# Patient Record
Sex: MALE | Race: WHITE | NOT HISPANIC OR LATINO
[De-identification: names, ages, dates, MRNs, and addresses within clinical notes are randomized per-mention and may not be internally consistent; named-entity substitution may affect disease eponyms.]

---

## 2017-01-11 ENCOUNTER — APPOINTMENT (OUTPATIENT)
Dept: INTERNAL MEDICINE | Facility: CLINIC | Age: 57
End: 2017-01-11

## 2017-01-11 ENCOUNTER — NON-APPOINTMENT (OUTPATIENT)
Age: 57
End: 2017-01-11

## 2017-01-11 VITALS — BODY MASS INDEX: 25.92 KG/M2 | HEIGHT: 69 IN | WEIGHT: 175 LBS

## 2017-01-11 LAB
BILIRUB UR QL STRIP: NORMAL
CLARITY UR: CLEAR
COLLECTION METHOD: NORMAL
GLUCOSE UR-MCNC: NORMAL
HCG UR QL: 0.2 EU/DL
HGB UR QL STRIP.AUTO: NORMAL
KETONES UR-MCNC: NORMAL
LEUKOCYTE ESTERASE UR QL STRIP: NORMAL
NITRITE UR QL STRIP: NORMAL
PH UR STRIP: 6.5
PROT UR STRIP-MCNC: NORMAL
SP GR UR STRIP: <=1.005

## 2017-01-12 ENCOUNTER — RESULT REVIEW (OUTPATIENT)
Age: 57
End: 2017-01-12

## 2017-01-12 LAB
BASOPHILS # BLD AUTO: 0.01 K/UL
BASOPHILS NFR BLD AUTO: 0.2 %
EOSINOPHIL # BLD AUTO: 0.02 K/UL
EOSINOPHIL NFR BLD AUTO: 0.4 %
HCT VFR BLD CALC: 47.5 %
HGB BLD-MCNC: 15.6 G/DL
IMM GRANULOCYTES NFR BLD AUTO: 0.2 %
LYMPHOCYTES # BLD AUTO: 1.71 K/UL
LYMPHOCYTES NFR BLD AUTO: 32.1 %
MAN DIFF?: NORMAL
MCHC RBC-ENTMCNC: 30.9 PG
MCHC RBC-ENTMCNC: 32.8 GM/DL
MCV RBC AUTO: 94.1 FL
MONOCYTES # BLD AUTO: 0.34 K/UL
MONOCYTES NFR BLD AUTO: 6.4 %
NEUTROPHILS # BLD AUTO: 3.23 K/UL
NEUTROPHILS NFR BLD AUTO: 60.7 %
PLATELET # BLD AUTO: 203 K/UL
PSA FREE FLD-MCNC: 18.4 %
PSA FREE SERPL-MCNC: 0.32 NG/ML
PSA SERPL-MCNC: 1.75 NG/ML
RBC # BLD: 5.05 M/UL
RBC # FLD: 13.5 %
TSH SERPL-ACNC: 3.1 UIU/ML
WBC # FLD AUTO: 5.32 K/UL

## 2017-01-13 LAB
ALBUMIN SERPL ELPH-MCNC: 4.7 G/DL
ALP BLD-CCNC: 56 U/L
ALT SERPL-CCNC: 24 U/L
ANION GAP SERPL CALC-SCNC: 16 MMOL/L
AST SERPL-CCNC: 20 U/L
BILIRUB SERPL-MCNC: 0.7 MG/DL
BUN SERPL-MCNC: 12 MG/DL
CALCIUM SERPL-MCNC: 9.5 MG/DL
CHLORIDE SERPL-SCNC: 99 MMOL/L
CHOLEST SERPL-MCNC: 225 MG/DL
CHOLEST/HDLC SERPL: 2.7 RATIO
CO2 SERPL-SCNC: 22 MMOL/L
CREAT SERPL-MCNC: 1 MG/DL
GLUCOSE SERPL-MCNC: 95 MG/DL
HDLC SERPL-MCNC: 82 MG/DL
LDLC SERPL CALC-MCNC: 128 MG/DL
POTASSIUM SERPL-SCNC: 4 MMOL/L
PROT SERPL-MCNC: 7 G/DL
SODIUM SERPL-SCNC: 137 MMOL/L
TRIGL SERPL-MCNC: 75 MG/DL

## 2017-09-21 ENCOUNTER — APPOINTMENT (OUTPATIENT)
Dept: INTERNAL MEDICINE | Facility: CLINIC | Age: 57
End: 2017-09-21
Payer: COMMERCIAL

## 2017-09-21 VITALS — HEIGHT: 70 IN | WEIGHT: 171 LBS | BODY MASS INDEX: 24.48 KG/M2

## 2017-09-21 LAB
BILIRUB UR QL STRIP: NORMAL
GLUCOSE UR-MCNC: NORMAL
HCG UR QL: 0.2 EU/DL
HGB UR QL STRIP.AUTO: NORMAL
KETONES UR-MCNC: NORMAL
LEUKOCYTE ESTERASE UR QL STRIP: NORMAL
NITRITE UR QL STRIP: NORMAL
PH UR STRIP: 6
PROT UR STRIP-MCNC: NORMAL
SP GR UR STRIP: 1.01

## 2017-09-21 PROCEDURE — 81003 URINALYSIS AUTO W/O SCOPE: CPT | Mod: QW

## 2017-09-21 PROCEDURE — 93000 ELECTROCARDIOGRAM COMPLETE: CPT

## 2017-09-21 PROCEDURE — 99396 PREV VISIT EST AGE 40-64: CPT | Mod: 25

## 2017-09-21 PROCEDURE — 36415 COLL VENOUS BLD VENIPUNCTURE: CPT

## 2017-09-22 ENCOUNTER — RX RENEWAL (OUTPATIENT)
Age: 57
End: 2017-09-22

## 2017-09-22 LAB
ALBUMIN SERPL ELPH-MCNC: 4.5 G/DL
ALP BLD-CCNC: 66 U/L
ALT SERPL-CCNC: 37 U/L
ANION GAP SERPL CALC-SCNC: 19 MMOL/L
AST SERPL-CCNC: 31 U/L
BASOPHILS # BLD AUTO: 0.01 K/UL
BASOPHILS NFR BLD AUTO: 0.2 %
BILIRUB SERPL-MCNC: 0.8 MG/DL
BUN SERPL-MCNC: 19 MG/DL
CALCIUM SERPL-MCNC: 9.7 MG/DL
CHLORIDE SERPL-SCNC: 103 MMOL/L
CHOLEST SERPL-MCNC: 220 MG/DL
CHOLEST/HDLC SERPL: 2.4 RATIO
CO2 SERPL-SCNC: 22 MMOL/L
CREAT SERPL-MCNC: 1.12 MG/DL
EOSINOPHIL # BLD AUTO: 0.02 K/UL
EOSINOPHIL NFR BLD AUTO: 0.4 %
GLUCOSE SERPL-MCNC: 93 MG/DL
HCT VFR BLD CALC: 48.2 %
HDLC SERPL-MCNC: 93 MG/DL
HGB BLD-MCNC: 15.8 G/DL
IMM GRANULOCYTES NFR BLD AUTO: 0.2 %
LDLC SERPL CALC-MCNC: 116 MG/DL
LYMPHOCYTES # BLD AUTO: 1.7 K/UL
LYMPHOCYTES NFR BLD AUTO: 30.8 %
MAN DIFF?: NORMAL
MCHC RBC-ENTMCNC: 30.3 PG
MCHC RBC-ENTMCNC: 32.8 GM/DL
MCV RBC AUTO: 92.5 FL
MONOCYTES # BLD AUTO: 0.38 K/UL
MONOCYTES NFR BLD AUTO: 6.9 %
NEUTROPHILS # BLD AUTO: 3.4 K/UL
NEUTROPHILS NFR BLD AUTO: 61.5 %
PLATELET # BLD AUTO: 214 K/UL
POTASSIUM SERPL-SCNC: 4.4 MMOL/L
PROT SERPL-MCNC: 7.3 G/DL
PSA FREE FLD-MCNC: 16.7
PSA FREE SERPL-MCNC: 0.35 NG/ML
PSA SERPL-MCNC: 2.09 NG/ML
RBC # BLD: 5.21 M/UL
RBC # FLD: 13.6 %
SODIUM SERPL-SCNC: 144 MMOL/L
T4 FREE SERPL-MCNC: 1.3 NG/DL
TRIGL SERPL-MCNC: 56 MG/DL
TSH SERPL-ACNC: 2.63 UIU/ML
WBC # FLD AUTO: 5.52 K/UL

## 2017-10-19 ENCOUNTER — APPOINTMENT (OUTPATIENT)
Dept: INTERNAL MEDICINE | Facility: CLINIC | Age: 57
End: 2017-10-19
Payer: COMMERCIAL

## 2017-10-19 VITALS
SYSTOLIC BLOOD PRESSURE: 140 MMHG | DIASTOLIC BLOOD PRESSURE: 80 MMHG | HEIGHT: 70 IN | WEIGHT: 171 LBS | HEART RATE: 89 BPM | OXYGEN SATURATION: 97 % | BODY MASS INDEX: 24.48 KG/M2

## 2017-10-19 DIAGNOSIS — I10 ESSENTIAL (PRIMARY) HYPERTENSION: ICD-10-CM

## 2017-10-19 PROCEDURE — 99213 OFFICE O/P EST LOW 20 MIN: CPT

## 2017-11-27 ENCOUNTER — APPOINTMENT (OUTPATIENT)
Dept: INTERNAL MEDICINE | Facility: CLINIC | Age: 57
End: 2017-11-27
Payer: COMMERCIAL

## 2017-11-27 VITALS
WEIGHT: 171 LBS | HEART RATE: 79 BPM | SYSTOLIC BLOOD PRESSURE: 120 MMHG | OXYGEN SATURATION: 98 % | HEIGHT: 70 IN | DIASTOLIC BLOOD PRESSURE: 76 MMHG | BODY MASS INDEX: 24.48 KG/M2

## 2017-11-27 PROCEDURE — 99213 OFFICE O/P EST LOW 20 MIN: CPT

## 2017-12-22 ENCOUNTER — MEDICATION RENEWAL (OUTPATIENT)
Age: 57
End: 2017-12-22

## 2018-11-28 ENCOUNTER — MEDICATION RENEWAL (OUTPATIENT)
Age: 58
End: 2018-11-28

## 2018-11-28 ENCOUNTER — APPOINTMENT (OUTPATIENT)
Dept: INTERNAL MEDICINE | Facility: CLINIC | Age: 58
End: 2018-11-28
Payer: COMMERCIAL

## 2018-11-28 ENCOUNTER — NON-APPOINTMENT (OUTPATIENT)
Age: 58
End: 2018-11-28

## 2018-11-28 VITALS
OXYGEN SATURATION: 99 % | WEIGHT: 179 LBS | HEART RATE: 78 BPM | HEIGHT: 70 IN | SYSTOLIC BLOOD PRESSURE: 144 MMHG | BODY MASS INDEX: 25.62 KG/M2 | DIASTOLIC BLOOD PRESSURE: 68 MMHG

## 2018-11-28 VITALS — DIASTOLIC BLOOD PRESSURE: 74 MMHG | SYSTOLIC BLOOD PRESSURE: 138 MMHG

## 2018-11-28 PROCEDURE — 99396 PREV VISIT EST AGE 40-64: CPT | Mod: 25

## 2018-11-28 PROCEDURE — 93000 ELECTROCARDIOGRAM COMPLETE: CPT

## 2018-11-28 PROCEDURE — 36415 COLL VENOUS BLD VENIPUNCTURE: CPT

## 2018-11-28 NOTE — HEALTH RISK ASSESSMENT
[] : No [0] : 2) Feeling down, depressed, or hopeless: Not at all (0) [de-identified] : occasional [[id number]] : 0 [HIV test declined] : HIV test declined [ColonoscopyDate] : 12/11

## 2018-11-28 NOTE — HISTORY OF PRESENT ILLNESS
[de-identified] : Patient presents for CPE. History is significant for hypertension on amlodipine and valsartan. He feels well and has no acute complaints.

## 2018-11-28 NOTE — ASSESSMENT
[FreeTextEntry1] : Will monitor BP at home. \par Further recommendations based on labs.\par Had flu vaccine. \par Colonoscopy 12/11. \par Follow up annually and prn.

## 2018-11-29 ENCOUNTER — TRANSCRIPTION ENCOUNTER (OUTPATIENT)
Age: 58
End: 2018-11-29

## 2018-11-29 LAB
ALBUMIN SERPL ELPH-MCNC: 4.9 G/DL
ALP BLD-CCNC: 54 U/L
ALT SERPL-CCNC: 46 U/L
ANION GAP SERPL CALC-SCNC: 12 MMOL/L
APPEARANCE: CLEAR
AST SERPL-CCNC: 28 U/L
BACTERIA: NEGATIVE
BASOPHILS # BLD AUTO: 0.01 K/UL
BASOPHILS NFR BLD AUTO: 0.2 %
BILIRUB SERPL-MCNC: 0.6 MG/DL
BILIRUBIN URINE: NEGATIVE
BLOOD URINE: NEGATIVE
BUN SERPL-MCNC: 14 MG/DL
CALCIUM SERPL-MCNC: 9.6 MG/DL
CHLORIDE SERPL-SCNC: 104 MMOL/L
CHOLEST SERPL-MCNC: 222 MG/DL
CHOLEST/HDLC SERPL: 2.7 RATIO
CO2 SERPL-SCNC: 26 MMOL/L
COLOR: YELLOW
CREAT SERPL-MCNC: 0.91 MG/DL
EOSINOPHIL # BLD AUTO: 0.03 K/UL
EOSINOPHIL NFR BLD AUTO: 0.5 %
GLUCOSE QUALITATIVE U: NEGATIVE MG/DL
GLUCOSE SERPL-MCNC: 81 MG/DL
HBA1C MFR BLD HPLC: 5.4 %
HCT VFR BLD CALC: 48.5 %
HCV AB SER QL: NONREACTIVE
HCV S/CO RATIO: 0.08 S/CO
HDLC SERPL-MCNC: 82 MG/DL
HGB BLD-MCNC: 15.5 G/DL
IMM GRANULOCYTES NFR BLD AUTO: 0 %
KETONES URINE: NEGATIVE
LDLC SERPL CALC-MCNC: 131 MG/DL
LEUKOCYTE ESTERASE URINE: NEGATIVE
LYMPHOCYTES # BLD AUTO: 1.94 K/UL
LYMPHOCYTES NFR BLD AUTO: 31.1 %
MAN DIFF?: NORMAL
MCHC RBC-ENTMCNC: 30.2 PG
MCHC RBC-ENTMCNC: 32 GM/DL
MCV RBC AUTO: 94.4 FL
MICROSCOPIC-UA: NORMAL
MONOCYTES # BLD AUTO: 0.31 K/UL
MONOCYTES NFR BLD AUTO: 5 %
NEUTROPHILS # BLD AUTO: 3.94 K/UL
NEUTROPHILS NFR BLD AUTO: 63.2 %
NITRITE URINE: NEGATIVE
PH URINE: 7
PLATELET # BLD AUTO: 228 K/UL
POTASSIUM SERPL-SCNC: 4.4 MMOL/L
PROT SERPL-MCNC: 7.5 G/DL
PROTEIN URINE: NEGATIVE MG/DL
PSA SERPL-MCNC: 2.12 NG/ML
RBC # BLD: 5.14 M/UL
RBC # FLD: 13.4 %
RED BLOOD CELLS URINE: 0 /HPF
SODIUM SERPL-SCNC: 143 MMOL/L
SPECIFIC GRAVITY URINE: 1.01
SQUAMOUS EPITHELIAL CELLS: 0 /HPF
TRIGL SERPL-MCNC: 46 MG/DL
TSH SERPL-ACNC: 2.71 UIU/ML
UROBILINOGEN URINE: NEGATIVE MG/DL
WBC # FLD AUTO: 6.23 K/UL
WHITE BLOOD CELLS URINE: 0 /HPF

## 2019-08-29 ENCOUNTER — RX RENEWAL (OUTPATIENT)
Age: 59
End: 2019-08-29

## 2019-11-07 ENCOUNTER — TRANSCRIPTION ENCOUNTER (OUTPATIENT)
Age: 59
End: 2019-11-07

## 2019-11-07 ENCOUNTER — APPOINTMENT (OUTPATIENT)
Dept: INTERNAL MEDICINE | Facility: CLINIC | Age: 59
End: 2019-11-07
Payer: COMMERCIAL

## 2019-11-07 ENCOUNTER — NON-APPOINTMENT (OUTPATIENT)
Age: 59
End: 2019-11-07

## 2019-11-07 VITALS
HEIGHT: 70 IN | BODY MASS INDEX: 25.77 KG/M2 | SYSTOLIC BLOOD PRESSURE: 145 MMHG | OXYGEN SATURATION: 99 % | HEART RATE: 66 BPM | DIASTOLIC BLOOD PRESSURE: 81 MMHG | WEIGHT: 180 LBS

## 2019-11-07 DIAGNOSIS — N40.0 BENIGN PROSTATIC HYPERPLASIA WITHOUT LOWER URINARY TRACT SYMPMS: ICD-10-CM

## 2019-11-07 PROCEDURE — 90471 IMMUNIZATION ADMIN: CPT

## 2019-11-07 PROCEDURE — 99396 PREV VISIT EST AGE 40-64: CPT | Mod: 25

## 2019-11-07 PROCEDURE — 96127 BRIEF EMOTIONAL/BEHAV ASSMT: CPT

## 2019-11-07 PROCEDURE — 36415 COLL VENOUS BLD VENIPUNCTURE: CPT

## 2019-11-07 PROCEDURE — 93000 ELECTROCARDIOGRAM COMPLETE: CPT

## 2019-11-07 PROCEDURE — 90715 TDAP VACCINE 7 YRS/> IM: CPT

## 2019-11-07 NOTE — ADDENDUM
[FreeTextEntry1] : I, Sanjuanita Lopez, acted solely as a scribe for Dr. Christianson on this date [11/7/2019].

## 2019-11-07 NOTE — HISTORY OF PRESENT ILLNESS
[de-identified] : Patient presents for CPE. History is significant for hypertension on amlodipine and valsartan. He checks his blood pressure at home and report that it is usually 130-135/90. He feels well and has no acute complaints.\par He has 3 children, ages 24, 22, and 16.

## 2019-11-07 NOTE — ASSESSMENT
[FreeTextEntry1] : Blood pressure high. Increase valsartan to 160mg qd. \par Labs drawn in office. Further recommendations based on results.\par Tdap administered in left deltoid in office with no localized reaction.\par Had flu vaccine. \par Colonoscopy 12/11.\par Follow up BP with new physician in 2-3 months.

## 2019-11-07 NOTE — END OF VISIT
[FreeTextEntry3] : All medical record entries made by the Scribe were at my, Dr. Christianson's, direction and personally dictated by me on [11/7/2019]. I have reviewed the chart and agree that the record accurately reflects my personal performance of the history, physical exam, assessment and plan. I have also personally directed, reviewed and agreed with the chart.

## 2019-11-08 ENCOUNTER — TRANSCRIPTION ENCOUNTER (OUTPATIENT)
Age: 59
End: 2019-11-08

## 2019-11-08 LAB
ALBUMIN SERPL ELPH-MCNC: 4.8 G/DL
ALP BLD-CCNC: 61 U/L
ALT SERPL-CCNC: 30 U/L
ANION GAP SERPL CALC-SCNC: 16 MMOL/L
APPEARANCE: CLEAR
AST SERPL-CCNC: 23 U/L
BACTERIA: NEGATIVE
BASOPHILS # BLD AUTO: 0.01 K/UL
BASOPHILS NFR BLD AUTO: 0.2 %
BILIRUB SERPL-MCNC: 0.6 MG/DL
BILIRUBIN URINE: NEGATIVE
BLOOD URINE: NEGATIVE
BUN SERPL-MCNC: 15 MG/DL
CALCIUM SERPL-MCNC: 9.6 MG/DL
CHLORIDE SERPL-SCNC: 101 MMOL/L
CHOLEST SERPL-MCNC: 207 MG/DL
CHOLEST/HDLC SERPL: 2.5 RATIO
CO2 SERPL-SCNC: 23 MMOL/L
COLOR: COLORLESS
CREAT SERPL-MCNC: 0.93 MG/DL
EOSINOPHIL # BLD AUTO: 0.04 K/UL
EOSINOPHIL NFR BLD AUTO: 0.7 %
GLUCOSE QUALITATIVE U: NEGATIVE
GLUCOSE SERPL-MCNC: 97 MG/DL
HCT VFR BLD CALC: 49.1 %
HDLC SERPL-MCNC: 83 MG/DL
HGB BLD-MCNC: 15.2 G/DL
HYALINE CASTS: 0 /LPF
IMM GRANULOCYTES NFR BLD AUTO: 0.4 %
KETONES URINE: NEGATIVE
LDLC SERPL CALC-MCNC: 108 MG/DL
LEUKOCYTE ESTERASE URINE: NEGATIVE
LYMPHOCYTES # BLD AUTO: 1.78 K/UL
LYMPHOCYTES NFR BLD AUTO: 33.2 %
MAN DIFF?: NORMAL
MCHC RBC-ENTMCNC: 29.9 PG
MCHC RBC-ENTMCNC: 31 GM/DL
MCV RBC AUTO: 96.7 FL
MICROSCOPIC-UA: NORMAL
MONOCYTES # BLD AUTO: 0.44 K/UL
MONOCYTES NFR BLD AUTO: 8.2 %
NEUTROPHILS # BLD AUTO: 3.07 K/UL
NEUTROPHILS NFR BLD AUTO: 57.3 %
NITRITE URINE: NEGATIVE
PH URINE: 6.5
PLATELET # BLD AUTO: 216 K/UL
POTASSIUM SERPL-SCNC: 4.4 MMOL/L
PROT SERPL-MCNC: 6.7 G/DL
PROTEIN URINE: NEGATIVE
PSA SERPL-MCNC: 2.3 NG/ML
RBC # BLD: 5.08 M/UL
RBC # FLD: 13.2 %
RED BLOOD CELLS URINE: 0 /HPF
SODIUM SERPL-SCNC: 140 MMOL/L
SPECIFIC GRAVITY URINE: 1.01
SQUAMOUS EPITHELIAL CELLS: 0 /HPF
TRIGL SERPL-MCNC: 79 MG/DL
TSH SERPL-ACNC: 3.32 UIU/ML
UROBILINOGEN URINE: NORMAL
WBC # FLD AUTO: 5.36 K/UL
WHITE BLOOD CELLS URINE: 0 /HPF

## 2019-11-11 ENCOUNTER — TRANSCRIPTION ENCOUNTER (OUTPATIENT)
Age: 59
End: 2019-11-11

## 2020-02-18 ENCOUNTER — RX RENEWAL (OUTPATIENT)
Age: 60
End: 2020-02-18

## 2020-06-17 ENCOUNTER — APPOINTMENT (OUTPATIENT)
Dept: FAMILY MEDICINE | Facility: CLINIC | Age: 60
End: 2020-06-17
Payer: COMMERCIAL

## 2020-06-17 VITALS
HEIGHT: 70 IN | HEART RATE: 70 BPM | BODY MASS INDEX: 24.77 KG/M2 | WEIGHT: 173 LBS | SYSTOLIC BLOOD PRESSURE: 138 MMHG | DIASTOLIC BLOOD PRESSURE: 70 MMHG

## 2020-06-17 DIAGNOSIS — Z82.49 FAMILY HISTORY OF ISCHEMIC HEART DISEASE AND OTHER DISEASES OF THE CIRCULATORY SYSTEM: ICD-10-CM

## 2020-06-17 DIAGNOSIS — Z78.9 OTHER SPECIFIED HEALTH STATUS: ICD-10-CM

## 2020-06-17 PROCEDURE — 36415 COLL VENOUS BLD VENIPUNCTURE: CPT

## 2020-06-17 PROCEDURE — 99203 OFFICE O/P NEW LOW 30 MIN: CPT | Mod: 25

## 2020-06-17 NOTE — PLAN
[FreeTextEntry1] : RECORDS REVIEWED AND CHART UPDATED\par BLOOD PRESSURE WELL CONTROLLED - CONTINUE CURRENT MEDICATIONS\par CHECK LAB WORK AND WILL MONITOR\par DUE FOR NEXT COLONOSCOPY\par CARDIOLOGY FOR FAMILY HISTORY / HTN\par FLU VACCINE IN THE FALL RECOMMENDED; CONSIDER SHINGRIX\par CALL WITH ANY QUESTIONS, CONCERNS OR CHANGES \par

## 2020-06-17 NOTE — PHYSICAL EXAM
[Well Nourished] : well nourished [No Acute Distress] : no acute distress [No Lymphadenopathy] : no lymphadenopathy [Well-Appearing] : well-appearing [Supple] : supple [No Respiratory Distress] : no respiratory distress  [Clear to Auscultation] : lungs were clear to auscultation bilaterally [No Accessory Muscle Use] : no accessory muscle use [Normal Rate] : normal rate  [Regular Rhythm] : with a regular rhythm [Speech Grossly Normal] : speech grossly normal [Normal S1, S2] : normal S1 and S2 [Normal Mood] : the mood was normal [Normal Affect] : the affect was normal

## 2020-06-17 NOTE — HISTORY OF PRESENT ILLNESS
[de-identified] : MR. ZARATE IS A PLEASANT 61 YO PRESENTING TO ESTABLISH CARE.  CHRONIC HISTORY OF HYPERTENSION.  TAKING AMLODIPINE AND VALSARTAN.  NO SIDE EFFECTS FROM MEDICATIONS.  BLOOD PRESSURE CONTROLLED.  HAS BEEN WALKING EVERY MORNING FOR THREE MILES.  DIET IS "OKAY".  AVOIDING JUNK FOODS. FEELING WELL TODAY WITHOUT COMPLAINT.  NO PRIOR CARDIOLOGY EVALUATION.  DUE FOR COLONOSCOPY.  HAS HAD NO HEADACHE, DIZZINESS, CHEST PAIN, SHORTNESS OF BREATH, GI OR URINARY COMPLAINTS.  NO OTHER COMPLAINTS TODAY. \par \par SPECIALISTS: \par GI: DR. CAMPBELL\par OPHTHALMOLOGY: [facility], YEARLY [FreeTextEntry1] : ESTABLISH CARE, FOLLOW-UP HYPERTENSION

## 2020-06-18 LAB
ANION GAP SERPL CALC-SCNC: 13 MMOL/L
BUN SERPL-MCNC: 14 MG/DL
CALCIUM SERPL-MCNC: 9.7 MG/DL
CHLORIDE SERPL-SCNC: 102 MMOL/L
CO2 SERPL-SCNC: 24 MMOL/L
CREAT SERPL-MCNC: 0.96 MG/DL
GLUCOSE SERPL-MCNC: 93 MG/DL
POTASSIUM SERPL-SCNC: 4.6 MMOL/L
SODIUM SERPL-SCNC: 140 MMOL/L

## 2020-07-01 LAB — HEMOCCULT STL QL IA: NEGATIVE

## 2020-11-06 ENCOUNTER — APPOINTMENT (OUTPATIENT)
Dept: FAMILY MEDICINE | Facility: CLINIC | Age: 60
End: 2020-11-06
Payer: COMMERCIAL

## 2020-11-06 VITALS
HEART RATE: 72 BPM | DIASTOLIC BLOOD PRESSURE: 80 MMHG | BODY MASS INDEX: 25.05 KG/M2 | HEIGHT: 70 IN | RESPIRATION RATE: 18 BRPM | SYSTOLIC BLOOD PRESSURE: 130 MMHG | WEIGHT: 175 LBS | TEMPERATURE: 97.3 F

## 2020-11-06 PROCEDURE — 99214 OFFICE O/P EST MOD 30 MIN: CPT

## 2020-11-06 PROCEDURE — 99072 ADDL SUPL MATRL&STAF TM PHE: CPT

## 2020-11-06 NOTE — HISTORY OF PRESENT ILLNESS
[No Pertinent Cardiac History] : no history of aortic stenosis, atrial fibrillation, coronary artery disease, recent myocardial infarction, or implantable device/pacemaker [No Pertinent Pulmonary History] : no history of asthma, COPD, sleep apnea, or smoking [No Adverse Anesthesia Reaction] : no adverse anesthesia reaction in self or family member [(Patient denies any chest pain, claudication, dyspnea on exertion, orthopnea, palpitations or syncope)] : Patient denies any chest pain, claudication, dyspnea on exertion, orthopnea, palpitations or syncope [____ METs%] : [unfilled] METs% [FreeTextEntry1] : testicle cyst removed [FreeTextEntry2] : 11/10/2020 [FreeTextEntry3] : Kush Bowling  [FreeTextEntry4] : Mr. GOYO ZARATE is a 60 year old male with PMH of HTN and HLD who normally follows up with Dr Anthony. Patient comes in to the office for preoperative clearance for a scrotal cyst removal by urology Dr Kush Bowling at [facility] on 11/06/2020. Patient denies any respiratory, GI, pain in his genital area or other complaints at this time.

## 2020-11-06 NOTE — PLAN
[FreeTextEntry1] : \par Mr. ZARATE is a 60 year male  who comes to the office for preoperative evaluation. Patient has greater than 4 METS, is a low perioperative risk for Major adverse cardiac event. ECG and blood work from 11/03/2020 at [facility] reviewed. No further testing indicated at this time. Patient is medically optimized for this procedure. \par \par During conversation with patient extensive medical records were reviewed including but not limited to, Hospital records records, out patient records, laboratory data and microbiology data \par In addition extensive time was also spent in reviewing diagnostic studies.\par \par Total encounter total time 25 mins \par >50% of time spent counseling/coordinating care \par \par Counseling included abnormal lab results, differential diagnoses, treatment options, risks and benefits, lifestyle changes, current condition, medications, and dose adjustments. \par The patient was interactive, attentive, asked questions, and verbalized understanding.\par

## 2020-11-06 NOTE — PHYSICAL EXAM
[No Acute Distress] : no acute distress [Well Nourished] : well nourished [Well Developed] : well developed [Well-Appearing] : well-appearing [Normal Sclera/Conjunctiva] : normal sclera/conjunctiva [PERRL] : pupils equal round and reactive to light [EOMI] : extraocular movements intact [Normal Outer Ear/Nose] : the outer ears and nose were normal in appearance [Normal Oropharynx] : the oropharynx was normal [No JVD] : no jugular venous distention [No Lymphadenopathy] : no lymphadenopathy [Supple] : supple [No Respiratory Distress] : no respiratory distress  [No Accessory Muscle Use] : no accessory muscle use [Clear to Auscultation] : lungs were clear to auscultation bilaterally [Normal Rate] : normal rate  [Regular Rhythm] : with a regular rhythm [Normal S1, S2] : normal S1 and S2 [No Murmur] : no murmur heard [No Carotid Bruits] : no carotid bruits [No Abdominal Bruit] : a ~M bruit was not heard ~T in the abdomen [No Varicosities] : no varicosities [Pedal Pulses Present] : the pedal pulses are present [No Edema] : there was no peripheral edema [No Palpable Aorta] : no palpable aorta [No Extremity Clubbing/Cyanosis] : no extremity clubbing/cyanosis [Soft] : abdomen soft [Non Tender] : non-tender [Non-distended] : non-distended [No Masses] : no abdominal mass palpated [No HSM] : no HSM [Normal Bowel Sounds] : normal bowel sounds [Urethral Meatus] : meatus normal [Penis Abnormality] : normal circumcised penis [Testes Tenderness] : no tenderness of the testes [Normal Posterior Cervical Nodes] : no posterior cervical lymphadenopathy [Normal Anterior Cervical Nodes] : no anterior cervical lymphadenopathy [No CVA Tenderness] : no CVA  tenderness [No Spinal Tenderness] : no spinal tenderness [No Joint Swelling] : no joint swelling [Grossly Normal Strength/Tone] : grossly normal strength/tone [No Rash] : no rash [Coordination Grossly Intact] : coordination grossly intact [No Focal Deficits] : no focal deficits [Normal Gait] : normal gait [Normal Affect] : the affect was normal [Normal Insight/Judgement] : insight and judgment were intact [Normal Nasal Mucosa] : the nasal mucosa was normal [Speech Grossly Normal] : speech grossly normal [Normal Mood] : the mood was normal [FreeTextEntry1] : 1x1cm soft mobile mass on right scrotum. No bleeding or other wounds noted

## 2020-11-09 ENCOUNTER — APPOINTMENT (OUTPATIENT)
Dept: FAMILY MEDICINE | Facility: CLINIC | Age: 60
End: 2020-11-09

## 2020-11-18 ENCOUNTER — APPOINTMENT (OUTPATIENT)
Dept: FAMILY MEDICINE | Facility: CLINIC | Age: 60
End: 2020-11-18
Payer: COMMERCIAL

## 2020-11-18 VITALS
SYSTOLIC BLOOD PRESSURE: 150 MMHG | TEMPERATURE: 97.9 F | BODY MASS INDEX: 25.05 KG/M2 | DIASTOLIC BLOOD PRESSURE: 72 MMHG | HEIGHT: 70 IN | WEIGHT: 175 LBS

## 2020-11-18 PROCEDURE — 99396 PREV VISIT EST AGE 40-64: CPT | Mod: 25

## 2020-11-18 PROCEDURE — 36415 COLL VENOUS BLD VENIPUNCTURE: CPT

## 2020-11-18 RX ORDER — OXYCODONE AND ACETAMINOPHEN 5; 325 MG/1; MG/1
5-325 TABLET ORAL
Qty: 10 | Refills: 0 | Status: DISCONTINUED | COMMUNITY
Start: 2020-11-10

## 2020-11-18 RX ORDER — CEFADROXIL 500 MG/1
500 CAPSULE ORAL
Qty: 14 | Refills: 0 | Status: COMPLETED | COMMUNITY
Start: 2020-07-09

## 2020-11-18 RX ORDER — SODIUM SULFATE, POTASSIUM SULFATE, MAGNESIUM SULFATE 17.5; 3.13; 1.6 G/ML; G/ML; G/ML
17.5-3.13-1.6 SOLUTION, CONCENTRATE ORAL
Qty: 354 | Refills: 0 | Status: COMPLETED | COMMUNITY
Start: 2020-07-07

## 2020-11-18 NOTE — PHYSICAL EXAM
[No Acute Distress] : no acute distress [Well Nourished] : well nourished [Well Developed] : well developed [Well-Appearing] : well-appearing [Normal Sclera/Conjunctiva] : normal sclera/conjunctiva [PERRL] : pupils equal round and reactive to light [EOMI] : extraocular movements intact [Normal Outer Ear/Nose] : the outer ears and nose were normal in appearance [Normal Oropharynx] : the oropharynx was normal [Normal TMs] : both tympanic membranes were normal [Normal Nasal Mucosa] : the nasal mucosa was normal [No JVD] : no jugular venous distention [No Lymphadenopathy] : no lymphadenopathy [Supple] : supple [No Respiratory Distress] : no respiratory distress  [No Accessory Muscle Use] : no accessory muscle use [Clear to Auscultation] : lungs were clear to auscultation bilaterally [Normal Rate] : normal rate  [Regular Rhythm] : with a regular rhythm [Normal S1, S2] : normal S1 and S2 [No Murmur] : no murmur heard [No Carotid Bruits] : no carotid bruits [No Abdominal Bruit] : a ~M bruit was not heard ~T in the abdomen [No Varicosities] : no varicosities [Pedal Pulses Present] : the pedal pulses are present [No Edema] : there was no peripheral edema [No Palpable Aorta] : no palpable aorta [No Extremity Clubbing/Cyanosis] : no extremity clubbing/cyanosis [Soft] : abdomen soft [Non Tender] : non-tender [Non-distended] : non-distended [No Masses] : no abdominal mass palpated [No HSM] : no HSM [Normal Axillary Nodes] : no axillary lymphadenopathy [Normal Posterior Cervical Nodes] : no posterior cervical lymphadenopathy [Normal Anterior Cervical Nodes] : no anterior cervical lymphadenopathy [No CVA Tenderness] : no CVA  tenderness [No Spinal Tenderness] : no spinal tenderness [No Joint Swelling] : no joint swelling [Grossly Normal Strength/Tone] : grossly normal strength/tone [No Rash] : no rash [Coordination Grossly Intact] : coordination grossly intact [No Focal Deficits] : no focal deficits [Normal Gait] : normal gait [Speech Grossly Normal] : speech grossly normal [Normal Affect] : the affect was normal [Normal Mood] : the mood was normal [Normal Insight/Judgement] : insight and judgment were intact [de-identified] : declined

## 2020-11-18 NOTE — DATA REVIEWED
[FreeTextEntry1] : I reviewed information including but not limited to previous notes and laboratories for this patient

## 2020-11-18 NOTE — HISTORY OF PRESENT ILLNESS
[FreeTextEntry1] : CPE [de-identified] : Mr. GOYO ZARATE is a 60 year old male with PMH of HTN who normally follows up with Dr Anthony. Patient comes to the office for CPE. He is s/p right scrotum cyst removal on 11/10/2020 by urology, w/o complications. Patient denies complaints today

## 2020-11-18 NOTE — ASSESSMENT
[FreeTextEntry1] : \par \par In regards annual physical exam: Influenza declined as he reports receiving it on oct/2020 given, Shingles vaccine declines: will find out with his his insurance\par Lab test ordered as noted. Recent lab wotrk and EKG reviewed in chart\par Colon cancer: FOBT done on  06/17/2020: WNL\par \par \par Regarding advanced care planning: I discussed with this patient the different options and importance of advance care planning, including but not limited to health care proxy designation, MOLST forms and/or writing patient's medical decision in this medical note. Patient was aware of the options. Please refer to advance care planning session of this note for detail information \par \par In regards to hypertension\par Patient's BP in adequate range. DIscussed avoid using salt and taking medications as indicated\par  \par In regards to hyperlipidemia and overweight\par I encouraged patient to do aerobic exercise, avoid fatty foods, to take the medications as prescribe and\par keep up follow up appointments with MD to monitor the cholesterol/triglycerides levels \par \par Return to care: within 6-12 months or as needed\par Call or return for any questions

## 2020-11-18 NOTE — HEALTH RISK ASSESSMENT
[Yes] : Yes [2 - 3 times a week (3 pts)] : 2 - 3  times a week (3 points) [1 or 2 (0 pts)] : 1 or 2 (0 points) [Never (0 pts)] : Never (0 points) [No] : In the past 12 months have you used drugs other than those required for medical reasons? No [0] : 2) Feeling down, depressed, or hopeless: Not at all (0) [Patient reported colonoscopy was normal] : Patient reported colonoscopy was normal [HIV Test offered] : HIV Test offered [Hepatitis C test offered] : Hepatitis C test offered [Employed] : employed [] :  [Sexually Active] : sexually active [Fully functional (bathing, dressing, toileting, transferring, walking, feeding)] : Fully functional (bathing, dressing, toileting, transferring, walking, feeding) [Fully functional (using the telephone, shopping, preparing meals, housekeeping, doing laundry, using] : Fully functional and needs no help or supervision to perform IADLs (using the telephone, shopping, preparing meals, housekeeping, doing laundry, using transportation, managing medications and managing finances) [Patient/Caregiver not ready to engage] : Patient/Caregiver not ready to engage [] : No [Audit-CScore] : 0 [[id number]] : 0 [High Risk Behavior] : no high risk behavior [ColonoscopyDate] : 08/2020 [ColonoscopyComments] : By Dr Rivero [AdvancecareDate] : 11/18/2020

## 2020-11-23 LAB
CHOLEST SERPL-MCNC: 235 MG/DL
ESTIMATED AVERAGE GLUCOSE: 108 MG/DL
HBA1C MFR BLD HPLC: 5.4 %
HCV AB SER QL: NONREACTIVE
HCV S/CO RATIO: 0.09 S/CO
HDLC SERPL-MCNC: 90 MG/DL
HIV1+2 AB SPEC QL IA.RAPID: NONREACTIVE
LDLC SERPL CALC-MCNC: 133 MG/DL
NONHDLC SERPL-MCNC: 145 MG/DL
TRIGL SERPL-MCNC: 60 MG/DL
TSH SERPL-ACNC: 2.24 UIU/ML

## 2021-01-13 ENCOUNTER — APPOINTMENT (OUTPATIENT)
Dept: FAMILY MEDICINE | Facility: CLINIC | Age: 61
End: 2021-01-13
Payer: COMMERCIAL

## 2021-01-13 ENCOUNTER — NON-APPOINTMENT (OUTPATIENT)
Age: 61
End: 2021-01-13

## 2021-01-13 VITALS
TEMPERATURE: 96.7 F | HEIGHT: 70 IN | HEART RATE: 84 BPM | WEIGHT: 175 LBS | RESPIRATION RATE: 16 BRPM | DIASTOLIC BLOOD PRESSURE: 76 MMHG | SYSTOLIC BLOOD PRESSURE: 137 MMHG | BODY MASS INDEX: 25.05 KG/M2

## 2021-01-13 PROCEDURE — 99214 OFFICE O/P EST MOD 30 MIN: CPT | Mod: 25

## 2021-01-13 PROCEDURE — 93000 ELECTROCARDIOGRAM COMPLETE: CPT

## 2021-01-13 PROCEDURE — 36415 COLL VENOUS BLD VENIPUNCTURE: CPT

## 2021-01-13 PROCEDURE — 99072 ADDL SUPL MATRL&STAF TM PHE: CPT

## 2021-01-13 NOTE — PHYSICAL EXAM
[No Acute Distress] : no acute distress [Well Nourished] : well nourished [Well Developed] : well developed [Well-Appearing] : well-appearing [Normal Sclera/Conjunctiva] : normal sclera/conjunctiva [PERRL] : pupils equal round and reactive to light [EOMI] : extraocular movements intact [Normal Outer Ear/Nose] : the outer ears and nose were normal in appearance [Normal Oropharynx] : the oropharynx was normal [Normal TMs] : both tympanic membranes were normal [Normal Nasal Mucosa] : the nasal mucosa was normal [No JVD] : no jugular venous distention [No Lymphadenopathy] : no lymphadenopathy [Supple] : supple [Thyroid Normal, No Nodules] : the thyroid was normal and there were no nodules present [No Respiratory Distress] : no respiratory distress  [No Accessory Muscle Use] : no accessory muscle use [Clear to Auscultation] : lungs were clear to auscultation bilaterally [Normal Rate] : normal rate  [Regular Rhythm] : with a regular rhythm [Normal S1, S2] : normal S1 and S2 [No Murmur] : no murmur heard [No Carotid Bruits] : no carotid bruits [Pedal Pulses Present] : the pedal pulses are present [No Edema] : there was no peripheral edema [No Palpable Aorta] : no palpable aorta [No Extremity Clubbing/Cyanosis] : no extremity clubbing/cyanosis [Soft] : abdomen soft [Non Tender] : non-tender [Non-distended] : non-distended [No Masses] : no abdominal mass palpated [No HSM] : no HSM [Normal Bowel Sounds] : normal bowel sounds [Normal Posterior Cervical Nodes] : no posterior cervical lymphadenopathy [Normal Anterior Cervical Nodes] : no anterior cervical lymphadenopathy [No CVA Tenderness] : no CVA  tenderness [No Joint Swelling] : no joint swelling [Grossly Normal Strength/Tone] : grossly normal strength/tone [No Rash] : no rash [Coordination Grossly Intact] : coordination grossly intact [No Focal Deficits] : no focal deficits [Normal Gait] : normal gait [Deep Tendon Reflexes (DTR)] : deep tendon reflexes were 2+ and symmetric [Speech Grossly Normal] : speech grossly normal [Normal Affect] : the affect was normal [Normal Mood] : the mood was normal [Normal Insight/Judgement] : insight and judgment were intact

## 2021-01-14 LAB
ALBUMIN SERPL ELPH-MCNC: 4.9 G/DL
ALP BLD-CCNC: 68 U/L
ALT SERPL-CCNC: 39 U/L
ANION GAP SERPL CALC-SCNC: 14 MMOL/L
APTT BLD: 31.8 SEC
AST SERPL-CCNC: 27 U/L
BASOPHILS # BLD AUTO: 0.02 K/UL
BASOPHILS NFR BLD AUTO: 0.3 %
BILIRUB SERPL-MCNC: 0.6 MG/DL
BUN SERPL-MCNC: 15 MG/DL
CALCIUM SERPL-MCNC: 9.7 MG/DL
CHLORIDE SERPL-SCNC: 103 MMOL/L
CO2 SERPL-SCNC: 23 MMOL/L
CREAT SERPL-MCNC: 1.03 MG/DL
EOSINOPHIL # BLD AUTO: 0.03 K/UL
EOSINOPHIL NFR BLD AUTO: 0.5 %
GLUCOSE SERPL-MCNC: 100 MG/DL
HCT VFR BLD CALC: 47.3 %
HGB BLD-MCNC: 15.2 G/DL
IMM GRANULOCYTES NFR BLD AUTO: 0.2 %
INR PPP: 1.02 RATIO
LYMPHOCYTES # BLD AUTO: 1.69 K/UL
LYMPHOCYTES NFR BLD AUTO: 27.3 %
MAN DIFF?: NORMAL
MCHC RBC-ENTMCNC: 30.3 PG
MCHC RBC-ENTMCNC: 32.1 GM/DL
MCV RBC AUTO: 94.4 FL
MONOCYTES # BLD AUTO: 0.5 K/UL
MONOCYTES NFR BLD AUTO: 8.1 %
NEUTROPHILS # BLD AUTO: 3.95 K/UL
NEUTROPHILS NFR BLD AUTO: 63.6 %
PLATELET # BLD AUTO: 218 K/UL
POTASSIUM SERPL-SCNC: 4 MMOL/L
PROT SERPL-MCNC: 6.7 G/DL
PT BLD: 12.1 SEC
RBC # BLD: 5.01 M/UL
RBC # FLD: 12.5 %
SODIUM SERPL-SCNC: 140 MMOL/L
WBC # FLD AUTO: 6.2 K/UL

## 2021-01-14 NOTE — RESULTS/DATA
[] : results reviewed [de-identified] : EKG reviewed: no acute ST or T-wave abnormalities, patient without cardiac symptoms.

## 2021-01-14 NOTE — HISTORY OF PRESENT ILLNESS
[No Pertinent Cardiac History] : no history of aortic stenosis, atrial fibrillation, coronary artery disease, recent myocardial infarction, or implantable device/pacemaker [No Pertinent Pulmonary History] : no history of asthma, COPD, sleep apnea, or smoking [No Adverse Anesthesia Reaction] : no adverse anesthesia reaction in self or family member [(Patient denies any chest pain, claudication, dyspnea on exertion, orthopnea, palpitations or syncope)] : Patient denies any chest pain, claudication, dyspnea on exertion, orthopnea, palpitations or syncope [Chronic Anticoagulation] : no chronic anticoagulation [Chronic Kidney Disease] : no chronic kidney disease [Diabetes] : no diabetes [FreeTextEntry1] : Right Knee partial medial meniscectomy [FreeTextEntry2] : 01/18/2021 [FreeTextEntry3] : Dr Lopez [FreeTextEntry4] : Mr. GOYO ZARATE is a 60 year old male with PMH of HTN and HLD who normally follows up with Dr Anthony. Patient comes in to the office for preoperative clearance for a Right Knee partial medial meniscectomy Dr Lopez at [facility] on 01/18/2021. Patient denies any respiratory, GI, cardiac or respiratory symptoms, walks > 4 blocks without getting chest pain, shortness of breath, denies any other complaints at this time. \par

## 2021-01-14 NOTE — ADDENDUM
[FreeTextEntry1] : 6:09 PM01/14/2021 Lab work reviewed, WNL. Patient is medically optimized for the procedure

## 2021-01-14 NOTE — ASSESSMENT
[As per surgery] : as per surgery [Patient Optimized for Surgery] : Patient optimized for surgery [FreeTextEntry4] : \par Mr. ZARATE is a 60 year male  who comes to the office for preoperative evaluation. Patient has greater than 4 METS, is a low perioperative risk for Major adverse cardiac event. ECG  reviewed. Pending CBC, CMP, INR, PT, pTT and if WNL he would be medically optimized for this procedure. \par \par During conversation with patient extensive medical records were reviewed including but not limited to, Hospital records records, out patient records, laboratory data and microbiology data \par In addition extensive time was also spent in reviewing diagnostic studies.\par \par Avoid NSAIDs, vitamins and aspirin containing products prior to surgery\par \par Total encounter total time 30 mins \par >50% of time spent counseling/coordinating care \par \par Counseling included abnormal lab results, differential diagnoses, treatment options, risks and benefits, lifestyle changes, current condition, medications, and dose adjustments. \par The patient was interactive, attentive, asked questions, and verbalized understanding. \par \par To fax clearance to [phone] and [phone]\par Phone: [phone]

## 2021-01-16 ENCOUNTER — RX RENEWAL (OUTPATIENT)
Age: 61
End: 2021-01-16

## 2021-03-06 ENCOUNTER — RX RENEWAL (OUTPATIENT)
Age: 61
End: 2021-03-06

## 2021-04-06 ENCOUNTER — TRANSCRIPTION ENCOUNTER (OUTPATIENT)
Age: 61
End: 2021-04-06

## 2021-04-07 ENCOUNTER — TRANSCRIPTION ENCOUNTER (OUTPATIENT)
Age: 61
End: 2021-04-07

## 2021-06-30 ENCOUNTER — TRANSCRIPTION ENCOUNTER (OUTPATIENT)
Age: 61
End: 2021-06-30

## 2021-06-30 ENCOUNTER — RX RENEWAL (OUTPATIENT)
Age: 61
End: 2021-06-30

## 2021-07-07 ENCOUNTER — TRANSCRIPTION ENCOUNTER (OUTPATIENT)
Age: 61
End: 2021-07-07

## 2021-07-20 ENCOUNTER — APPOINTMENT (OUTPATIENT)
Dept: FAMILY MEDICINE | Facility: CLINIC | Age: 61
End: 2021-07-20
Payer: COMMERCIAL

## 2021-07-20 VITALS
TEMPERATURE: 97.5 F | HEIGHT: 70 IN | DIASTOLIC BLOOD PRESSURE: 90 MMHG | SYSTOLIC BLOOD PRESSURE: 150 MMHG | OXYGEN SATURATION: 98 % | WEIGHT: 180 LBS | BODY MASS INDEX: 25.77 KG/M2 | HEART RATE: 78 BPM

## 2021-07-20 VITALS — SYSTOLIC BLOOD PRESSURE: 146 MMHG | DIASTOLIC BLOOD PRESSURE: 92 MMHG

## 2021-07-20 PROCEDURE — 36415 COLL VENOUS BLD VENIPUNCTURE: CPT

## 2021-07-20 PROCEDURE — 99072 ADDL SUPL MATRL&STAF TM PHE: CPT

## 2021-07-20 PROCEDURE — 99213 OFFICE O/P EST LOW 20 MIN: CPT | Mod: 25

## 2021-07-22 ENCOUNTER — RX RENEWAL (OUTPATIENT)
Age: 61
End: 2021-07-22

## 2021-07-22 LAB
ALBUMIN SERPL ELPH-MCNC: 4.9 G/DL
ALP BLD-CCNC: 76 U/L
ALT SERPL-CCNC: 58 U/L
ANION GAP SERPL CALC-SCNC: 15 MMOL/L
AST SERPL-CCNC: 38 U/L
BILIRUB SERPL-MCNC: 0.7 MG/DL
BUN SERPL-MCNC: 13 MG/DL
CALCIUM SERPL-MCNC: 9.8 MG/DL
CHLORIDE SERPL-SCNC: 105 MMOL/L
CHOLEST SERPL-MCNC: 205 MG/DL
CO2 SERPL-SCNC: 20 MMOL/L
CREAT SERPL-MCNC: 0.88 MG/DL
GLUCOSE SERPL-MCNC: 98 MG/DL
HDLC SERPL-MCNC: 77 MG/DL
LDLC SERPL CALC-MCNC: 111 MG/DL
NONHDLC SERPL-MCNC: 127 MG/DL
POTASSIUM SERPL-SCNC: 4 MMOL/L
PROT SERPL-MCNC: 7.1 G/DL
PSA SERPL-MCNC: 3.07 NG/ML
SODIUM SERPL-SCNC: 141 MMOL/L
TRIGL SERPL-MCNC: 82 MG/DL

## 2021-07-24 NOTE — HISTORY OF PRESENT ILLNESS
[FreeTextEntry1] : F/U HYPERTENSION [de-identified] : MS. ZARATE IS A PLEASANT 62 YO PRESENTING FOR FOLLOW-UP.  HISTORY OF HYPERTENSION, HYPERLIPDIEMIA AND OVERWEIGHT.  MISSED TAKING AMLODIPINE YESTERDAY AND TODAY.  BLOOD PRESSURE AT HOME IN 'S SYSTOLIC.  STARTED LIPITOR IN NOVEMBER BY COVERING PROVIDER.  TRYING TO EAT HEALTHY.  IS FEELING WELL WITHOUT SYMPTOMS.

## 2021-07-24 NOTE — PLAN
[FreeTextEntry1] : COMPLIANCE WITH MEDICATIONS\par BLOOD PRESSURE LIKELY HIGH FROM MISSING DOSES OF AMLODIPINE\par HEALTHY DIET AND LIFESTYLE MODIFICATIONS\par HEALTHY WEIGHT LOSS \par MONITOR BLOOD PRESSURE\par CHECK LAB WORK INCLUDING LIPIDS\par CALL WITH ANY QUESTIONS, CONCERNS OR CHANGES

## 2021-07-24 NOTE — PHYSICAL EXAM
[No Acute Distress] : no acute distress [Well Nourished] : well nourished [Normal Sclera/Conjunctiva] : normal sclera/conjunctiva [PERRL] : pupils equal round and reactive to light [No Respiratory Distress] : no respiratory distress  [No Accessory Muscle Use] : no accessory muscle use [Clear to Auscultation] : lungs were clear to auscultation bilaterally [Normal Rate] : normal rate  [Regular Rhythm] : with a regular rhythm [Normal S1, S2] : normal S1 and S2 [No Joint Swelling] : no joint swelling [Grossly Normal Strength/Tone] : grossly normal strength/tone

## 2021-08-16 ENCOUNTER — APPOINTMENT (OUTPATIENT)
Dept: FAMILY MEDICINE | Facility: CLINIC | Age: 61
End: 2021-08-16
Payer: COMMERCIAL

## 2021-08-16 VITALS
HEART RATE: 81 BPM | DIASTOLIC BLOOD PRESSURE: 80 MMHG | BODY MASS INDEX: 25.91 KG/M2 | WEIGHT: 181 LBS | SYSTOLIC BLOOD PRESSURE: 160 MMHG | HEIGHT: 70 IN

## 2021-08-16 VITALS — DIASTOLIC BLOOD PRESSURE: 66 MMHG | SYSTOLIC BLOOD PRESSURE: 136 MMHG

## 2021-08-16 DIAGNOSIS — R74.01 ELEVATION OF LEVELS OF LIVER TRANSAMINASE LEVELS: ICD-10-CM

## 2021-08-16 PROCEDURE — 99213 OFFICE O/P EST LOW 20 MIN: CPT

## 2021-08-16 NOTE — PLAN
[FreeTextEntry1] : RECENT LAB WORK REVIEWED\par AVOIDANCE OF TYLENOL, ETOH AND RECHECK LFT'S WITH HEPATITIS PANEL\par HEALTHY DIET AND LIFESTYLE MODIFICATIONS\par BLOOD PRESSURE IMPROVED ON RECHECK AND WILL MONITOR AT HOME.  WAS NERVOUS FOR LAB RESULTS\par CONTINUE CURRENT MEDICATIONS\par CALL WITH ANY QUESTIONS, CONCERNS OR CHANGES

## 2021-08-16 NOTE — HISTORY OF PRESENT ILLNESS
[FreeTextEntry1] : F/U HYPERTENSION [de-identified] : MR. ZARATE IS A PLEASANT 62 YO PRESENTING FOR FOLLOW-UP OF HYPERTENSION.  ON VALSARTAN AND AMLODIPINE.  COMPLIANT TO MEDICATION.  BLOOD PRESSURE READING AT HOME HAVE BEEN GOOD 120'S / 80'S.  ALSO HISTORY OF HYPERLIPIDEMIA ON LIPITOR.  WALKING AND PLAYING TENNIS FOR EXERCISE.  TRYING TO IMPROVE DIET.  LFT'S USUALLY WELL CONTROLLED.  MAY HAVE HAD TYLENOL PRIOR TO LAB WORK.  MAY HAVE HAD ALCOHOL PRIOR TO LAB WORK.  NO ABDOMINAL PAIN.  NO N/V/D.

## 2021-09-13 ENCOUNTER — RX RENEWAL (OUTPATIENT)
Age: 61
End: 2021-09-13

## 2021-10-14 ENCOUNTER — APPOINTMENT (OUTPATIENT)
Dept: FAMILY MEDICINE | Facility: CLINIC | Age: 61
End: 2021-10-14

## 2021-10-20 ENCOUNTER — RX RENEWAL (OUTPATIENT)
Age: 61
End: 2021-10-20

## 2021-10-29 ENCOUNTER — RX RENEWAL (OUTPATIENT)
Age: 61
End: 2021-10-29

## 2021-12-17 ENCOUNTER — APPOINTMENT (OUTPATIENT)
Dept: FAMILY MEDICINE | Facility: CLINIC | Age: 61
End: 2021-12-17
Payer: COMMERCIAL

## 2021-12-17 ENCOUNTER — NON-APPOINTMENT (OUTPATIENT)
Age: 61
End: 2021-12-17

## 2021-12-17 VITALS
BODY MASS INDEX: 25.34 KG/M2 | SYSTOLIC BLOOD PRESSURE: 160 MMHG | HEART RATE: 78 BPM | WEIGHT: 177 LBS | HEIGHT: 70 IN | DIASTOLIC BLOOD PRESSURE: 80 MMHG

## 2021-12-17 VITALS — SYSTOLIC BLOOD PRESSURE: 142 MMHG | DIASTOLIC BLOOD PRESSURE: 90 MMHG

## 2021-12-17 DIAGNOSIS — Z01.818 ENCOUNTER FOR OTHER PREPROCEDURAL EXAMINATION: ICD-10-CM

## 2021-12-17 DIAGNOSIS — Z23 ENCOUNTER FOR IMMUNIZATION: ICD-10-CM

## 2021-12-17 DIAGNOSIS — Z11.4 ENCOUNTER FOR SCREENING FOR HUMAN IMMUNODEFICIENCY VIRUS [HIV]: ICD-10-CM

## 2021-12-17 DIAGNOSIS — Z11.59 ENCOUNTER FOR SCREENING FOR OTHER VIRAL DISEASES: ICD-10-CM

## 2021-12-17 DIAGNOSIS — Z13.1 ENCOUNTER FOR SCREENING FOR DIABETES MELLITUS: ICD-10-CM

## 2021-12-17 DIAGNOSIS — Z13.29 ENCOUNTER FOR SCREENING FOR OTHER SUSPECTED ENDOCRINE DISORDER: ICD-10-CM

## 2021-12-17 DIAGNOSIS — Z13.220 ENCOUNTER FOR SCREENING FOR LIPOID DISORDERS: ICD-10-CM

## 2021-12-17 PROCEDURE — 99396 PREV VISIT EST AGE 40-64: CPT | Mod: 25

## 2021-12-17 PROCEDURE — G0444 DEPRESSION SCREEN ANNUAL: CPT | Mod: NC,59

## 2021-12-17 PROCEDURE — 36415 COLL VENOUS BLD VENIPUNCTURE: CPT

## 2021-12-17 NOTE — PHYSICAL EXAM
ID Progress Note    Subjective:  Lynne Villatoro is a 55 year old female we are following for infected surgical abdominal wound.    10/18 - Having chills intermittently. Afebrile. Tenderness near incision site. Feeling very tired and has a poor appetite. Skin irritation on right side in abdominal folds. No decrease in urine output    10/20 - Still having tenderness near the wound. Remains afebrile. Dressing was changed this morning and new fluid was sent for analysis. Plan to have PICC line placed for IV antibiotics since there is no need for surgical intervention at this time based on CT results. WBC 8.6 today.    10/21 - patient is able to eat which just a little bit of nausea but no vomiting.  There were no fevers chills or sweats.  -she has a PICC line in place  -she continues to have issues with a foul odor coming from the wound.  The dressing has been changed twice but the nursing staff today so far.  The dressing was removed from the mid abdominal incision it was saturated and yellow fluid on the gauze.  Wound is being managed by [facility]    Objective:  Time patient was examined: 1:27 PM    • carvedilol  6.25 mg Oral Q12H   • sodium chloride (PF)  10 mL Injection 2 times per day   • apixaBAN  5 mg Oral 2 times per day   • prochlorperazine  5 mg Oral Q8H   • doxycycline hyclate  100 mg Oral BID   • acetic acid   Irrigation 2 times per day   • nystatin   Topical 2 times per day   • furosemide  20 mg Oral Daily   • AMIODarone  200 mg Oral Daily   • aspirin  81 mg Oral Daily   • atorvastatin  10 mg Oral Daily   • buPROPion XL  300 mg Oral Daily   • busPIRone  10 mg Oral Nightly   • lisinopril  5 mg Oral Daily   • pantoprazole  40 mg Oral Daily   • spironolactone  25 mg Oral Daily   • cefepime (MAXIPIME) IVPB  1,000 mg Intravenous 3 times per day   • sodium chloride (PF)  2 mL Intracatheter 2 times per day       ALLERGIES:  Aspirin, Bactrim, Augmentin [amoclan], and Citalopram    Visit Vitals  /77  (BP Location: RUE - Right upper extremity, Patient Position: Sitting)   Pulse 85   Temp 97.9 °F (36.6 °C) (Oral)   Resp 18   Ht 5' 3\" (1.6 m)   Wt 90.3 kg (199 lb 1.2 oz)   LMP 04/04/2015 (Approximate)   SpO2 96%   BMI 35.26 kg/m²     Temp:  [97.9 °F (36.6 °C)-98.8 °F (37.1 °C)] 97.9 °F (36.6 °C)  Heart Rate:  [85-90] 85  Resp:  [18] 18  BP: (123-137)/(63-77) 123/77    Intake/Output Summary (Last 24 hours) at 10/21/2021 1327  Last data filed at 10/21/2021 1137  Gross per 24 hour   Intake 880 ml   Output 3525 ml   Net -2645 ml       Gen: NAD. Lying supine in bed. Well nourished. Hydrated. Alert but appears tired. Obese.  Psych: Pleasant. Cooperative. Normal affect and mood.  HEENT: NC/AT. Pupils equal. Conjunctive unremarkable. Moist oral mucosa.  Neck: Supple.  CV: RRR. No murmurs.   Resp: CTA bilaterally. Good effort. Non labored respirations.  Dee: Soft. Non-distended. Clear yellow urine in urostomy bag. Tenderness around surgical incision site. Skin indurated inferior and lateral to incision. Elliptical shaped incision, with erythema of surrounding skin. Cloudy yellow, pooling drainage with foul odor. Wound bed is reddish/pink. The wound was re- packed with Dakins soaked gauze and covered with dry gauze, secured with tape. No rebound or guarding.   Ext: No edema noted.   Skin: Erythematous rash with few satellite lesions noted in right abdominal fold, consistent with candida intertrigo. Fresh application of Nystatin this morning.  Neuro: A/O x 3. No gross neuro deficits.    Recent Labs   Lab 10/21/21  0605 10/20/21  0629 10/18/21  0535   WBC 7.8 8.6 8.7   RBC 3.41* 3.33* 3.27*   HGB 10.0* 9.8* 9.4*   HCT 32.3* 31.7* 31.4*   MCV 94.7 95.2 96.0   MCH 29.3 29.4 28.7   MCHC 31.0* 30.9* 29.9*    353 371       Recent Labs   Lab 10/21/21  0605 10/20/21  0629 10/18/21  0535 10/17/21  0536 10/16/21  1812   CALCIUM 9.2 8.9 8.9 8.7 9.2   BUN 14 14 12 17 27*   ALBUMIN  --   --   --  2.5* 3.2*   AST  --   --   --  12  17   CO2 23 24 22 22 19*   CREATININE 1.00* 0.99* 1.02* 0.92 1.21*   ANIONGAP 8* 11 8* 7* 10       Recent Labs   Lab 10/16/21  1812   INR 1.0       Micro:    10/12 wound cx - few pseudomonas aeruginosa. Few coag neg staph. Many peptoniphilus asaccharolyticus. Rare candida glabrata.   10/16 blood cx - NGTD  10/16 Covid and RSV- Negative  10/16 UA & Cx -  leukocytes.  >100,000 CFU/mL, Multiple organisms isolated with no predominant type, consistent with contamination. Consider recollection.   10/17 Abdomen wound cx - many PMNs. No epithelial cells seen. Many Gram + cocci. Few gram neg bacilli. Few pseudomonas, moderate strep constellatus, and few eikenella corrodens      Path:  9/2:  Pathologic Diagnosis   A.   Colon, transverse, polypectomy:  -Multiple fragments of tubular adenoma     B.   Colon, rectum, polypectomy:  -Hyperplastic polyp      9/3:  Pathologic Diagnosis   A.   Cervix, uterus, left ovary and fallopian tube, resection:  -Unremarkable ectocervix and endocervix.  -Atrophic endometrium.  -Leiomyoma.  -Ovary with benign fibroma.     B.   Right ovary and fallopian tube, resection:  -Unremarkable fallopian tube and ovary.     C.   Ileum, resection:  -No significant histologic abnormality.     D.   Ileum and cecum, resection:  -Ileostomy with superficial erosions.  No other significant histologic abnormality.      Imaging:      10/16:  CT ABDOMEN PELVIS WO CONTRAST  Narrative: CT of the abdomen and pelvis without contrast     Clinical history: Low-grade fever for a few days. Nausea, chills and  lightheadedness. Diabetic Total hysterectomy/stoma revision 9/3/2021     Technique: Axial CT images of the abdomen and pelvis were acquired. Coronal  and sagittal images were generated per protocol.     CT dose reduction: Yes     COMPARISON: Sonography of the urinary tract 10/8/2021     Findings: Within the limits of the noncontrast study:     Air and feathery edema is evident in adjacent to a midline  abdominal  incision but no identifiable abscess or focal fluid collection. Surgical  sutures are evident in the right colon. No intra-abdominal or pelvic  abscess is evident.     The liver, gallbladder and biliary tree are normal. There is no biliary  tree distention.     The pancreas and pancreatic duct are normal. There is no inflammation  surrounding the pancreas.     The spleen is normal in size and position without focal abnormality.     Both adrenal glands are normal.     No abnormality of the kidneys is evident. There is no significant urinary  tract distention. No renal calculi are evident.     Atherosclerosis of the abdominal aorta is noted without focal enlargement.  The inferior vena cava and mesenteric vessels are normal.     There is no mass or adenopathy in the abdomen, retroperitoneum or pelvis.     The uterus and ovaries are absent. The bladder is collapsed. There is no  dependent pelvic fluid or adnexal mass.     No inguinal or abdominal wall abnormalities are evident.     The included lung bases and pleural spaces are clear. Note is made of  several coronary artery stents in place.     There are no acute osseous concerns. Mild degenerative changes throughout  the included spine.  Impression: IMPRESSION:.     Postsurgical changes of the bowel and lower midline anterior abdomen with  subcutaneous emphysema and adjacent subcutaneous induration but no  drainable abscess or focal fluid collection.  XR Chest AP or PA  Narrative: EXAM:  XR CHEST AP OR PA -- 10/16/2021 6:54 PM     CLINICAL INDICATION:  55 years-old Female, history of Fever     COMPARISON:  8/13/2021     ___________________  Impression: FINDINGS/IMPRESSION:    AP portable chest     *  No acute cardiopulmonary process.   *  No pneumothorax.    *  Elevated right hemidiaphragm.  *  Stable cardiomediastinal silhouette.  Unchanged left subclavian  pacemaker/ICD with lead tips in right atrium, right ventricle and coronary  sinus.      IR  PICC  Non-reportable by Radiologist.       Assessment:  1. 55 year old female with sepsis secondary to infected surgical abdominal wound  - patient underwent #3 on 9/3/21 and had a post op infection that caused her to stay a total of 2 weeks in the hospital after her procedure  - wound opened up more at home with home RN, increased drainage; new onset odor the last few days  - Wound cx with many PMNs, no epithelial cells, many gram + cocci, few gram neg bacilli; cx pseudomonas so far sens P. WBC improved.  CT today no fluid collection  - blood cx NGTD  - CT with contrast showing subcutaneous gas present but no abscess   -my concern would be that she has an enteric fistula given the copious amounts of drainage that she has.    -my concern given the amount of drainage it she is having is that there is an occult fistula present.     2. 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 S/P exploratory laparotomy with BETY/BSO for a pelvic mass, ileal conduit revision, and right hemicolectomy - with Dr. Dailey and Dr. Torres on 9/3/21  - pathology was significant for leiomyoma and ovary with benign fibroma, hyperplastic polyp of colon/rectum and multiple fragments of tubular adenoma, and ileostomy with superficial erosions      4. Candida intertrigo like rash - right side abdominal folds     5. Acute renal failure - Cr at 0.99 today  6. Chronic systolic HF with  ICD  7. Hypertension  8. Anemia - stable   9. Diabetes Mellitus - on insulin, pump  10. GERD  11. COPD        Plan  -continue IV vancomycin and cefepime   --would consider possible transfer to [facility] so she to be further evaluated by Dr. Dailey.  I am concerned that there is a fistula present.  If so require major change in treatment including NPO status  pia  -discussed with Dr. Tai Tellez  -discussed with Dr. Osvaldo Shepard M.D.  Infectious Diseases  W[facility]  [facility]         10/21/2021               [No Acute Distress] : no acute distress [Well Nourished] : well nourished [Well-Appearing] : well-appearing [Normal Sclera/Conjunctiva] : normal sclera/conjunctiva [PERRL] : pupils equal round and reactive to light [Normal Outer Ear/Nose] : the outer ears and nose were normal in appearance [Normal Oropharynx] : the oropharynx was normal [Normal TMs] : both tympanic membranes were normal [No Lymphadenopathy] : no lymphadenopathy [Supple] : supple [No Respiratory Distress] : no respiratory distress  [No Accessory Muscle Use] : no accessory muscle use [Clear to Auscultation] : lungs were clear to auscultation bilaterally [Normal Rate] : normal rate  [Regular Rhythm] : with a regular rhythm [Normal S1, S2] : normal S1 and S2 [Soft] : abdomen soft [Non Tender] : non-tender [Normal Bowel Sounds] : normal bowel sounds [No Joint Swelling] : no joint swelling [Grossly Normal Strength/Tone] : grossly normal strength/tone [No Rash] : no rash [Coordination Grossly Intact] : coordination grossly intact [No Focal Deficits] : no focal deficits [Normal Gait] : normal gait [Deep Tendon Reflexes (DTR)] : deep tendon reflexes were 2+ and symmetric [Speech Grossly Normal] : speech grossly normal [Memory Grossly Normal] : memory grossly normal [Normal Mood] : the mood was normal

## 2021-12-18 ENCOUNTER — RX RENEWAL (OUTPATIENT)
Age: 61
End: 2021-12-18

## 2021-12-18 LAB
ALBUMIN SERPL ELPH-MCNC: 4.9 G/DL
ALP BLD-CCNC: 76 U/L
ALT SERPL-CCNC: 42 U/L
ANION GAP SERPL CALC-SCNC: 12 MMOL/L
APPEARANCE: CLEAR
AST SERPL-CCNC: 29 U/L
BASOPHILS # BLD AUTO: 0.02 K/UL
BASOPHILS NFR BLD AUTO: 0.4 %
BILIRUB SERPL-MCNC: 0.6 MG/DL
BILIRUBIN URINE: NEGATIVE
BLOOD URINE: NEGATIVE
BUN SERPL-MCNC: 12 MG/DL
CALCIUM SERPL-MCNC: 9.8 MG/DL
CHLORIDE SERPL-SCNC: 102 MMOL/L
CO2 SERPL-SCNC: 25 MMOL/L
COLOR: COLORLESS
CREAT SERPL-MCNC: 0.96 MG/DL
EOSINOPHIL # BLD AUTO: 0.04 K/UL
EOSINOPHIL NFR BLD AUTO: 0.7 %
ESTIMATED AVERAGE GLUCOSE: 111 MG/DL
GLUCOSE QUALITATIVE U: NEGATIVE
GLUCOSE SERPL-MCNC: 98 MG/DL
HBA1C MFR BLD HPLC: 5.5 %
HCT VFR BLD CALC: 50 %
HGB BLD-MCNC: 15.9 G/DL
IMM GRANULOCYTES NFR BLD AUTO: 0.4 %
KETONES URINE: NEGATIVE
LEUKOCYTE ESTERASE URINE: NEGATIVE
LYMPHOCYTES # BLD AUTO: 1.76 K/UL
LYMPHOCYTES NFR BLD AUTO: 31.9 %
MAN DIFF?: NORMAL
MCHC RBC-ENTMCNC: 30.8 PG
MCHC RBC-ENTMCNC: 31.8 GM/DL
MCV RBC AUTO: 96.7 FL
MONOCYTES # BLD AUTO: 0.39 K/UL
MONOCYTES NFR BLD AUTO: 7.1 %
NEUTROPHILS # BLD AUTO: 3.28 K/UL
NEUTROPHILS NFR BLD AUTO: 59.5 %
NITRITE URINE: NEGATIVE
PH URINE: 6.5
PLATELET # BLD AUTO: 239 K/UL
POTASSIUM SERPL-SCNC: 4.6 MMOL/L
PROT SERPL-MCNC: 7.2 G/DL
PROTEIN URINE: NEGATIVE
RBC # BLD: 5.17 M/UL
RBC # FLD: 13.1 %
SODIUM SERPL-SCNC: 138 MMOL/L
SPECIFIC GRAVITY URINE: 1
T4 FREE SERPL-MCNC: 1.2 NG/DL
TSH SERPL-ACNC: 2.15 UIU/ML
UROBILINOGEN URINE: NORMAL
WBC # FLD AUTO: 5.51 K/UL

## 2021-12-18 NOTE — HISTORY OF PRESENT ILLNESS
[FreeTextEntry1] : physical [de-identified] : MR. ZARATE IS A PLEASANT 62 YO PRESENTING FOR YEARLY PHYSICAL.  HE HAS AN UPCOMING APPOINTMENT IN JANUARY WITH CARDIOLOGY DR. BELLAMY.  HISTORY OF HYPERTENSION.  BLOOD PRESSURE IS GOOD AT HOME.  GETS NERVOUS AT DOCTOR VISITS.  IS ON AMLODIPINE AND VALSARTAN FOR BLOOD PRESSURE AND IS ON LIPITOR FOR CHOLESTEROL.

## 2021-12-18 NOTE — HEALTH RISK ASSESSMENT
[Excellent] : ~his/her~ current health as excellent [Never] : Never [Yes] : Yes [2 - 3 times a week (3 pts)] : 2 - 3  times a week (3 points) [1 or 2 (0 pts)] : 1 or 2 (0 points) [Never (0 pts)] : Never (0 points) [No] : In the past 12 months have you used drugs other than those required for medical reasons? No [0] : 2) Feeling down, depressed, or hopeless: Not at all (0) [PHQ-2 Negative - No further assessment needed] : PHQ-2 Negative - No further assessment needed [HIV test declined] : HIV test declined [Hepatitis C test declined] : Hepatitis C test declined [None] : None [With Significant Other] : lives with significant other [Employed] : employed [Graduate School] : graduate school [] :  [# Of Children ___] : has [unfilled] children [Sexually Active] : sexually active [Feels Safe at Home] : Feels safe at home [Reports normal functional visual acuity (ie: able to read med bottle)] : Reports normal functional visual acuity [Smoke Detector] : smoke detector [Carbon Monoxide Detector] : carbon monoxide detector [Safety elements used in home] : safety elements used in home [Seat Belt] :  uses seat belt [With Patient/Caregiver] : , with patient/caregiver [Audit-CScore] : 3 [de-identified] : walks, golf, platform tennis [de-identified] : diet is good.  some room to improve.  could eat more produce. [[id number]] : 0 [Change in mental status noted] : No change in mental status noted [Language] : denies difficulty with language [Learning/Retaining New Information] : denies difficulty learning/retaining new information [Handling Complex Tasks] : denies difficulty handling complex tasks [High Risk Behavior] : no high risk behavior [Reports changes in hearing] : Reports no changes in hearing [Reports changes in vision] : Reports no changes in vision [Reports changes in dental health] : Reports no changes in dental health [ColonoscopyDate] : 08/20 [de-identified] : wears glasses for reading and distance - did not bring today; upcoming eye appointment [AdvancecareDate] : 12/21

## 2021-12-18 NOTE — PLAN
[FreeTextEntry1] : VISION SCREENING\par SAFETY/HEALTHY HABITS REVIEWED\par HEALTHY DIET AND LIFESTYLE MODIFICATIONS\par CHECK ROUTINE LAB WORK\par VACCINATIONS DISCUSSED\par FOLLOW-UP ALL SPECIALISTS AS DIRECTED \par CALL WITH ANY QUESTIONS, CONCERNS OR CHANGES

## 2021-12-20 ENCOUNTER — TRANSCRIPTION ENCOUNTER (OUTPATIENT)
Age: 61
End: 2021-12-20

## 2021-12-20 LAB
CHOLEST SERPL-MCNC: 177 MG/DL
HDLC SERPL-MCNC: 85 MG/DL
LDLC SERPL CALC-MCNC: 77 MG/DL
NONHDLC SERPL-MCNC: 92 MG/DL
TRIGL SERPL-MCNC: 73 MG/DL

## 2022-02-01 ENCOUNTER — RX RENEWAL (OUTPATIENT)
Age: 62
End: 2022-02-01

## 2022-02-16 NOTE — HEALTH RISK ASSESSMENT
[Yes] : Yes [2 - 3 times a week (3 pts)] : 2 - 3  times a week (3 points) [1 or 2 (0 pts)] : 1 or 2 (0 points) [Never (0 pts)] : Never (0 points) [No] : In the past 12 months have you used drugs other than those required for medical reasons? No [0] : 2) Feeling down, depressed, or hopeless: Not at all (0) [] : No [Audit-CScore] : 3 [[id number]] : 0 x 4 years

## 2022-04-30 ENCOUNTER — TRANSCRIPTION ENCOUNTER (OUTPATIENT)
Age: 62
End: 2022-04-30

## 2022-05-02 ENCOUNTER — TRANSCRIPTION ENCOUNTER (OUTPATIENT)
Age: 62
End: 2022-05-02

## 2022-07-26 ENCOUNTER — RX RENEWAL (OUTPATIENT)
Age: 62
End: 2022-07-26

## 2022-07-26 ENCOUNTER — TRANSCRIPTION ENCOUNTER (OUTPATIENT)
Age: 62
End: 2022-07-26

## 2022-08-09 ENCOUNTER — RX RENEWAL (OUTPATIENT)
Age: 62
End: 2022-08-09

## 2022-10-24 ENCOUNTER — RX RENEWAL (OUTPATIENT)
Age: 62
End: 2022-10-24

## 2022-11-03 ENCOUNTER — RX RENEWAL (OUTPATIENT)
Age: 62
End: 2022-11-03

## 2022-11-08 ENCOUNTER — TRANSCRIPTION ENCOUNTER (OUTPATIENT)
Age: 62
End: 2022-11-08

## 2022-11-23 ENCOUNTER — RX RENEWAL (OUTPATIENT)
Age: 62
End: 2022-11-23

## 2022-11-27 ENCOUNTER — TRANSCRIPTION ENCOUNTER (OUTPATIENT)
Age: 62
End: 2022-11-27

## 2022-11-27 ENCOUNTER — RX RENEWAL (OUTPATIENT)
Age: 62
End: 2022-11-27

## 2022-12-20 ENCOUNTER — RX RENEWAL (OUTPATIENT)
Age: 62
End: 2022-12-20

## 2022-12-20 ENCOUNTER — APPOINTMENT (OUTPATIENT)
Dept: FAMILY MEDICINE | Facility: CLINIC | Age: 62
End: 2022-12-20

## 2022-12-20 VITALS — DIASTOLIC BLOOD PRESSURE: 86 MMHG | SYSTOLIC BLOOD PRESSURE: 136 MMHG

## 2022-12-20 VITALS
HEIGHT: 70 IN | SYSTOLIC BLOOD PRESSURE: 142 MMHG | DIASTOLIC BLOOD PRESSURE: 74 MMHG | HEART RATE: 76 BPM | OXYGEN SATURATION: 99 % | BODY MASS INDEX: 25.91 KG/M2 | WEIGHT: 181 LBS

## 2022-12-20 PROCEDURE — 99213 OFFICE O/P EST LOW 20 MIN: CPT | Mod: 25

## 2022-12-20 PROCEDURE — G0444 DEPRESSION SCREEN ANNUAL: CPT | Mod: 59

## 2022-12-20 PROCEDURE — 36415 COLL VENOUS BLD VENIPUNCTURE: CPT

## 2022-12-20 PROCEDURE — 99396 PREV VISIT EST AGE 40-64: CPT | Mod: 25

## 2022-12-26 NOTE — PLAN
[FreeTextEntry1] : VISION SCREENING\par SAFETY / HEALTHY HABITS REVIEWED\par HEALTHY DIET AND LIFESTYLE MODIFICATIONS\par VACCINATIONS DISCUSSED: COVID\par CHECK ROUTINE LAB WORK\par FOLLOW-UP ALL SPECIALISTS AS DIRECTED\par \par BLOOD PRESSURE CONTROLLED\par HEALTHY DIET AND LIFESTYLE MODIFICATIONS\par HEALTHY WEIGHT LOSS \par WILL CONTINUE LIPITOR 20 MG QHS\par CHECK LAB WORK INCLUDING LIPIDS\par NEED CARDIOLOGY CONSULTANT NOTES\par CALL WITH ANY QUESTIONS, CONCERNS OR CHANGES

## 2022-12-26 NOTE — HISTORY OF PRESENT ILLNESS
[FreeTextEntry1] : physical [de-identified] : MR. ZARATE IS A PLEASANT 61 YO PRESENTING FOR YEARLY PHYSICAL.  \par GI: DR. ARIAS\par CARDIOLOGY: DR. MINAL BELLAMY; RECENT EKG, STRESS AND ECHOCARDIOGRAM THIS YEAR\par OPHTHALMOLOGY: YEARLY; [facility]; UPCOMING APPOINTMENT\par \par IS ALSO HERE FOR CHRONIC MEDICAL CARE.  HISTORY OF HYPERTENSION AND HYPERLIPIDEMIA.  BLOOD PRESSURE IS CONTROLLED ON AMLODIPINE AND VALSARTAN.  TAKING LIPITOR FOR CHOLESTEROL.   IS WALKING THREE MILES IN THE MORNING FOR EXERCISE.  ALSO PLAYS GOLF AND PLATFORM TENNIS.  DIET IS "OKAY".  EATING LESS BAD FOODS.  UP TO DATE WITH CARDIAC WORK-UP.  FEELING WELL.

## 2022-12-26 NOTE — HEALTH RISK ASSESSMENT
[Excellent] : ~his/her~ current health as excellent [Good] : ~his/her~  mood as  good [Never] : Never [Yes] : Yes [2 - 3 times a week (3 pts)] : 2 - 3  times a week (3 points) [1 or 2 (0 pts)] : 1 or 2 (0 points) [Never (0 pts)] : Never (0 points) [No] : In the past 12 months have you used drugs other than those required for medical reasons? No [0] : 2) Feeling down, depressed, or hopeless: Not at all (0) [PHQ-2 Negative - No further assessment needed] : PHQ-2 Negative - No further assessment needed [Employed] : employed [] :  [# Of Children ___] : has [unfilled] children [Fully functional (bathing, dressing, toileting, transferring, walking, feeding)] : Fully functional (bathing, dressing, toileting, transferring, walking, feeding) [Fully functional (using the telephone, shopping, preparing meals, housekeeping, doing laundry, using] : Fully functional and needs no help or supervision to perform IADLs (using the telephone, shopping, preparing meals, housekeeping, doing laundry, using transportation, managing medications and managing finances) [Reports normal functional visual acuity (ie: able to read med bottle)] : Reports normal functional visual acuity [Reports changes in dental health] : Reports changes in dental health [Smoke Detector] : smoke detector [Carbon Monoxide Detector] : carbon monoxide detector [Safety elements used in home] : safety elements used in home [Seat Belt] :  uses seat belt [Sunscreen] : uses sunscreen [With Patient/Caregiver] : , with patient/caregiver [HIV Test offered] : HIV Test offered [Hepatitis C test offered] : Hepatitis C test offered [None] : None [With Family] : lives with family [# of Members in Household ___] :  household currently consist of [unfilled] member(s) [Graduate School] : graduate school [Feels Safe at Home] : Feels safe at home [Audit-CScore] : 3 [de-identified] : WALKING 3 MILES IN THE MORNING, GOLF, PLATFORM TENNIS [de-identified] : "OKAY", LESS BAD FOODS [[id number]] : 0 [Change in mental status noted] : No change in mental status noted [Language] : denies difficulty with language [Learning/Retaining New Information] : denies difficulty learning/retaining new information [Handling Complex Tasks] : denies difficulty handling complex tasks [Reports changes in hearing] : Reports no changes in hearing [Reports changes in vision] : Reports no changes in vision [ColonoscopyDate] : 08/20 [de-identified] : GLASSES FOR DISTANCE AND READING [AdvancecareDate] : 12/22

## 2023-01-10 ENCOUNTER — TRANSCRIPTION ENCOUNTER (OUTPATIENT)
Age: 63
End: 2023-01-10

## 2023-03-16 ENCOUNTER — TRANSCRIPTION ENCOUNTER (OUTPATIENT)
Age: 63
End: 2023-03-16

## 2023-03-16 ENCOUNTER — RX RENEWAL (OUTPATIENT)
Age: 63
End: 2023-03-16

## 2023-03-16 LAB
ALBUMIN SERPL ELPH-MCNC: 4.8 G/DL
ALP BLD-CCNC: 81 U/L
ALT SERPL-CCNC: 36 U/L
ANION GAP SERPL CALC-SCNC: 13 MMOL/L
APPEARANCE: CLEAR
AST SERPL-CCNC: 30 U/L
BILIRUB SERPL-MCNC: 0.8 MG/DL
BILIRUBIN URINE: NEGATIVE
BLOOD URINE: NEGATIVE
BUN SERPL-MCNC: 15 MG/DL
CALCIUM SERPL-MCNC: 9.5 MG/DL
CHLORIDE SERPL-SCNC: 102 MMOL/L
CHOLEST SERPL-MCNC: 231 MG/DL
CO2 SERPL-SCNC: 22 MMOL/L
COLOR: COLORLESS
CREAT SERPL-MCNC: 1.04 MG/DL
EGFR: 82 ML/MIN/1.73M2
GLUCOSE QUALITATIVE U: NEGATIVE
GLUCOSE SERPL-MCNC: 96 MG/DL
HCV AB SER QL: NONREACTIVE
HCV S/CO RATIO: 0.07 S/CO
HDLC SERPL-MCNC: 87 MG/DL
HEMOCCULT STL QL IA: NEGATIVE
HIV1+2 AB SPEC QL IA.RAPID: NONREACTIVE
KETONES URINE: NEGATIVE
LDLC SERPL CALC-MCNC: 133 MG/DL
LEUKOCYTE ESTERASE URINE: NEGATIVE
NITRITE URINE: NEGATIVE
NONHDLC SERPL-MCNC: 144 MG/DL
PH URINE: 6.5
POTASSIUM SERPL-SCNC: 4.5 MMOL/L
PROT SERPL-MCNC: 7.1 G/DL
PROTEIN URINE: NEGATIVE
PSA SERPL-MCNC: 3.45 NG/ML
SODIUM SERPL-SCNC: 137 MMOL/L
SPECIFIC GRAVITY URINE: 1
T4 FREE SERPL-MCNC: 1.2 NG/DL
TRIGL SERPL-MCNC: 57 MG/DL
TSH SERPL-ACNC: 2.37 UIU/ML
UROBILINOGEN URINE: NORMAL

## 2023-04-10 ENCOUNTER — RX RENEWAL (OUTPATIENT)
Age: 63
End: 2023-04-10

## 2023-04-10 ENCOUNTER — TRANSCRIPTION ENCOUNTER (OUTPATIENT)
Age: 63
End: 2023-04-10

## 2023-06-12 ENCOUNTER — RX RENEWAL (OUTPATIENT)
Age: 63
End: 2023-06-12

## 2023-06-19 ENCOUNTER — RX RENEWAL (OUTPATIENT)
Age: 63
End: 2023-06-19

## 2023-06-21 ENCOUNTER — APPOINTMENT (OUTPATIENT)
Dept: FAMILY MEDICINE | Facility: CLINIC | Age: 63
End: 2023-06-21
Payer: COMMERCIAL

## 2023-06-21 VITALS
DIASTOLIC BLOOD PRESSURE: 82 MMHG | BODY MASS INDEX: 25.34 KG/M2 | SYSTOLIC BLOOD PRESSURE: 144 MMHG | HEART RATE: 76 BPM | WEIGHT: 177 LBS | HEIGHT: 70 IN

## 2023-06-21 VITALS — SYSTOLIC BLOOD PRESSURE: 136 MMHG | DIASTOLIC BLOOD PRESSURE: 84 MMHG

## 2023-06-21 PROCEDURE — 99214 OFFICE O/P EST MOD 30 MIN: CPT | Mod: 25

## 2023-06-21 PROCEDURE — 36415 COLL VENOUS BLD VENIPUNCTURE: CPT

## 2023-06-23 LAB
CHOLEST SERPL-MCNC: 172 MG/DL
HDLC SERPL-MCNC: 92 MG/DL
LDLC SERPL CALC-MCNC: 68 MG/DL
NONHDLC SERPL-MCNC: 80 MG/DL
TRIGL SERPL-MCNC: 58 MG/DL

## 2023-06-27 LAB
ALBUMIN SERPL ELPH-MCNC: 4.9 G/DL
ALP BLD-CCNC: 84 U/L
ALT SERPL-CCNC: 63 U/L
ANION GAP SERPL CALC-SCNC: 14 MMOL/L
AST SERPL-CCNC: 33 U/L
BILIRUB SERPL-MCNC: 1.1 MG/DL
BUN SERPL-MCNC: 12 MG/DL
CALCIUM SERPL-MCNC: 9.7 MG/DL
CHLORIDE SERPL-SCNC: 101 MMOL/L
CO2 SERPL-SCNC: 24 MMOL/L
CREAT SERPL-MCNC: 0.97 MG/DL
EGFR: 89 ML/MIN/1.73M2
GLUCOSE SERPL-MCNC: 93 MG/DL
POTASSIUM SERPL-SCNC: 3.9 MMOL/L
PROT SERPL-MCNC: 7 G/DL
SODIUM SERPL-SCNC: 139 MMOL/L

## 2023-07-02 NOTE — PHYSICAL EXAM
[No Acute Distress] : no acute distress [Well Nourished] : well nourished [Well-Appearing] : well-appearing [Normal Sclera/Conjunctiva] : normal sclera/conjunctiva [PERRL] : pupils equal round and reactive to light [No Respiratory Distress] : no respiratory distress  [No Accessory Muscle Use] : no accessory muscle use [Clear to Auscultation] : lungs were clear to auscultation bilaterally [Normal Rate] : normal rate  [Regular Rhythm] : with a regular rhythm [Normal S1, S2] : normal S1 and S2 [No Joint Swelling] : no joint swelling [Speech Grossly Normal] : speech grossly normal [Memory Grossly Normal] : memory grossly normal [Normal Mood] : the mood was normal

## 2023-07-02 NOTE — REVIEW OF SYSTEMS
Render In Strict Bullet Format?: No Discontinue Regimen: Triamcinolone ointment [Negative] : Heme/Lymph Detail Level: Simple Plan: Follow up in 30-37 days + fasting labs Initiate Treatment: Triamcinolone 0.1% cream BID Initiate Treatment: Increase Claravis 40mg QD

## 2023-07-02 NOTE — PLAN
[FreeTextEntry1] : CARDIOLOGY NOTE AND PRIOR LAB WORK REVIEWED\par WILL MONITOR LIPIDS\par HEALTHY DIET AND LIFESTYLE MODIFICATIONS\par HEALTHY WEIGHT LOSS \par BLOOD PRESSURE CONTROLLED\par CONTINUE CURRENT MEDICATIONS\par CALL WITH ANY QUESTIONS, CONCERNS OR CHANGES

## 2023-07-02 NOTE — HISTORY OF PRESENT ILLNESS
[FreeTextEntry1] : f/u cholesterol [de-identified] : MR. ZARATE IS A PLEASANT 62 YO PRESENTING FOR FOLLOW-UP.  IS EATING HEALTHIER MORE RECENTLY.  WALKING 3 MILES IN THE MORNING AND GOLFING.   HISTORY OF HYPERTENSION AND HYPERLIPIDEMIA. BLOOD PRESSURE IS CONTROLLED ON AMLODIPINE AND VALSARTAN. TAKING LIPITOR FOR CHOLESTEROL.\par \par CARDIOLOGY: DR. BELLAMY - SEEN FEBRUARY r/o [facility]

## 2023-08-21 ENCOUNTER — APPOINTMENT (OUTPATIENT)
Dept: FAMILY MEDICINE | Facility: CLINIC | Age: 63
End: 2023-08-21
Payer: COMMERCIAL

## 2023-08-21 VITALS
HEART RATE: 96 BPM | BODY MASS INDEX: 25.34 KG/M2 | WEIGHT: 177 LBS | SYSTOLIC BLOOD PRESSURE: 152 MMHG | OXYGEN SATURATION: 99 % | DIASTOLIC BLOOD PRESSURE: 82 MMHG | HEIGHT: 70 IN

## 2023-08-21 DIAGNOSIS — R74.01 ELEVATION OF LEVELS OF LIVER TRANSAMINASE LEVELS: ICD-10-CM

## 2023-08-21 DIAGNOSIS — E66.3 OVERWEIGHT: ICD-10-CM

## 2023-08-21 DIAGNOSIS — E78.5 HYPERLIPIDEMIA, UNSPECIFIED: ICD-10-CM

## 2023-08-21 PROCEDURE — 99214 OFFICE O/P EST MOD 30 MIN: CPT

## 2023-08-21 NOTE — HISTORY OF PRESENT ILLNESS
[FreeTextEntry1] : F/U LAB WORK [de-identified] : MR. ZARATE IS A PLEASANT 62 YO PRESENTING FOR FOLLOW-UP TO REVIEW LAB WORK.  HE HAS A HISTORY OF HYPERTENSION AND HYPERLIPIDEMIA.  IS ON LIPTIOR FOR CHOLESTEROL AND IS TAKING AMLODIPINE AND VALSARTAN FOR BLOOD PRESSURE CONTROL.  WORKING ON DIET.  ELEVATED LFT ON LAB WORK.  NO ABDOMINAL PAIN.  NO N/V/D.  MAY HAVE HAD ALCOHOL PRIOR TO BLOOD WORK.  NO TYLENOL. IN PAST ALT A LITTLE ELEVATED UP AND DOWN SINCE STARTING STATIN.

## 2023-08-21 NOTE — PHYSICAL EXAM
[No Acute Distress] : no acute distress [Well Nourished] : well nourished [Well-Appearing] : well-appearing [No Respiratory Distress] : no respiratory distress  [No Accessory Muscle Use] : no accessory muscle use [Clear to Auscultation] : lungs were clear to auscultation bilaterally [Normal Rate] : normal rate  [Regular Rhythm] : with a regular rhythm [Normal S1, S2] : normal S1 and S2 [Soft] : abdomen soft [Non Tender] : non-tender [Normal Bowel Sounds] : normal bowel sounds [Speech Grossly Normal] : speech grossly normal [Memory Grossly Normal] : memory grossly normal [Normal Mood] : the mood was normal

## 2023-08-21 NOTE — PLAN
[FreeTextEntry1] : LAB WORK REVIEWED AVOIDANCE OF TYLENOL AND ETOH RECHECK LFTS WITH HEPATITIS PANEL HEALTHY DIET AND LIFESTYLE MODIFICATIONS WILL CONTINUE LIPITOR 20 MG QHS - LIPIDS WELL CONTROLLED BLOOD PRESSURE CONTROLLED CONTINUE AMLODIPINE 5 MG DAILY CONTINUE VALSARTAN 160 MG DAILY FOLLOW-UP ALL SPECIALISTS AS DIRECTED  CALL WITH ANY QUESTIONS, CONCERNS OR CHANGES

## 2023-08-22 ENCOUNTER — NON-APPOINTMENT (OUTPATIENT)
Age: 63
End: 2023-08-22

## 2023-08-22 DIAGNOSIS — Z92.89 PERSONAL HISTORY OF OTHER MEDICAL TREATMENT: ICD-10-CM

## 2023-08-22 DIAGNOSIS — R94.31 ABNORMAL ELECTROCARDIOGRAM [ECG] [EKG]: ICD-10-CM

## 2023-09-21 ENCOUNTER — RX RENEWAL (OUTPATIENT)
Age: 63
End: 2023-09-21

## 2023-11-20 ENCOUNTER — RX RENEWAL (OUTPATIENT)
Age: 63
End: 2023-11-20

## 2023-11-29 ENCOUNTER — RX RENEWAL (OUTPATIENT)
Age: 63
End: 2023-11-29

## 2023-12-18 ENCOUNTER — RX RENEWAL (OUTPATIENT)
Age: 63
End: 2023-12-18

## 2023-12-21 ENCOUNTER — APPOINTMENT (OUTPATIENT)
Dept: FAMILY MEDICINE | Facility: CLINIC | Age: 63
End: 2023-12-21
Payer: COMMERCIAL

## 2023-12-21 VITALS — DIASTOLIC BLOOD PRESSURE: 86 MMHG | SYSTOLIC BLOOD PRESSURE: 138 MMHG

## 2023-12-21 VITALS
BODY MASS INDEX: 26.05 KG/M2 | DIASTOLIC BLOOD PRESSURE: 92 MMHG | HEIGHT: 70 IN | WEIGHT: 182 LBS | SYSTOLIC BLOOD PRESSURE: 158 MMHG | OXYGEN SATURATION: 98 % | HEART RATE: 102 BPM

## 2023-12-21 DIAGNOSIS — Z00.00 ENCOUNTER FOR GENERAL ADULT MEDICAL EXAMINATION W/OUT ABNORMAL FINDINGS: ICD-10-CM

## 2023-12-21 PROCEDURE — 99396 PREV VISIT EST AGE 40-64: CPT | Mod: 25

## 2023-12-21 PROCEDURE — 36415 COLL VENOUS BLD VENIPUNCTURE: CPT

## 2023-12-22 LAB
ALBUMIN SERPL ELPH-MCNC: 4.8 G/DL
ALP BLD-CCNC: 70 U/L
ALT SERPL-CCNC: 46 U/L
ANION GAP SERPL CALC-SCNC: 16 MMOL/L
APPEARANCE: CLEAR
AST SERPL-CCNC: 32 U/L
BASOPHILS # BLD AUTO: 0.02 K/UL
BASOPHILS NFR BLD AUTO: 0.4 %
BILIRUB SERPL-MCNC: 0.5 MG/DL
BILIRUBIN URINE: NEGATIVE
BLOOD URINE: NEGATIVE
BUN SERPL-MCNC: 14 MG/DL
CALCIUM SERPL-MCNC: 9.7 MG/DL
CHLORIDE SERPL-SCNC: 102 MMOL/L
CHOLEST SERPL-MCNC: 179 MG/DL
CO2 SERPL-SCNC: 24 MMOL/L
COLOR: YELLOW
CREAT SERPL-MCNC: 0.98 MG/DL
EGFR: 88 ML/MIN/1.73M2
EOSINOPHIL # BLD AUTO: 0.04 K/UL
EOSINOPHIL NFR BLD AUTO: 0.9 %
ESTIMATED AVERAGE GLUCOSE: 105 MG/DL
GLUCOSE QUALITATIVE U: NEGATIVE MG/DL
GLUCOSE SERPL-MCNC: 97 MG/DL
HBA1C MFR BLD HPLC: 5.3 %
HCT VFR BLD CALC: 50 %
HDLC SERPL-MCNC: 76 MG/DL
HGB BLD-MCNC: 16.3 G/DL
IMM GRANULOCYTES NFR BLD AUTO: 0.2 %
KETONES URINE: NEGATIVE MG/DL
LDLC SERPL CALC-MCNC: 90 MG/DL
LEUKOCYTE ESTERASE URINE: NEGATIVE
LYMPHOCYTES # BLD AUTO: 1.78 K/UL
LYMPHOCYTES NFR BLD AUTO: 38 %
MAN DIFF?: NORMAL
MCHC RBC-ENTMCNC: 31 PG
MCHC RBC-ENTMCNC: 32.6 GM/DL
MCV RBC AUTO: 95.2 FL
MONOCYTES # BLD AUTO: 0.39 K/UL
MONOCYTES NFR BLD AUTO: 8.3 %
NEUTROPHILS # BLD AUTO: 2.45 K/UL
NEUTROPHILS NFR BLD AUTO: 52.2 %
NITRITE URINE: NEGATIVE
NONHDLC SERPL-MCNC: 103 MG/DL
PH URINE: 6.5
PLATELET # BLD AUTO: 214 K/UL
POTASSIUM SERPL-SCNC: 4.7 MMOL/L
PROT SERPL-MCNC: 7 G/DL
PROTEIN URINE: NEGATIVE MG/DL
PSA SERPL-MCNC: 2.75 NG/ML
RBC # BLD: 5.25 M/UL
RBC # FLD: 12.8 %
SODIUM SERPL-SCNC: 142 MMOL/L
SPECIFIC GRAVITY URINE: 1.01
T4 FREE SERPL-MCNC: 1.2 NG/DL
TRIGL SERPL-MCNC: 69 MG/DL
TSH SERPL-ACNC: 2.37 UIU/ML
UROBILINOGEN URINE: 0.2 MG/DL
WBC # FLD AUTO: 4.69 K/UL

## 2023-12-26 NOTE — HISTORY OF PRESENT ILLNESS
[FreeTextEntry1] : physical [de-identified] : MR. ZARATE IS A PLEASANT 64 YO PRESENTING FOR YEARLY PHYSICAL.  ROUTINELY FOLLOWS WITH CARDIOLOGY.  HISTORY OF HYPERLIPIDEMIA, HYPERTENSION AND OVERWEIGHT.  IS FEELING WELL TODAY.

## 2023-12-26 NOTE — PLAN
[FreeTextEntry1] : WILL HAVE EKG AT UPCOMING CARDIOLOGY APPOINTMENT VISION SCREENING SAFETY / HEALTHY HABITS REVIEWED HEALTHY DIET AND LIFESTYLE MODIFICATIONS VACCINATIONS DISCUSSED: COVID, RSV CHECK ROUTINE LAB WORK FOLLOW-UP ALL SPECIALISTS AS DIRECTED CALL WITH ANY QUESTIONS, CONCERNS OR CHANGES

## 2023-12-26 NOTE — HEALTH RISK ASSESSMENT
[Excellent] : ~his/her~ current health as excellent [Very Good] : ~his/her~  mood as very good [Yes] : Yes [2 - 3 times a week (3 pts)] : 2 - 3  times a week (3 points) [1 or 2 (0 pts)] : 1 or 2 (0 points) [Never (0 pts)] : Never (0 points) [No] : In the past 12 months have you used drugs other than those required for medical reasons? No [No falls in past year] : Patient reported no falls in the past year [Little interest or pleasure doing things] : 1) Little interest or pleasure doing things [Feeling down, depressed, or hopeless] : 2) Feeling down, depressed, or hopeless [0] : 2) Feeling down, depressed, or hopeless: Not at all (0) [PHQ-2 Negative - No further assessment needed] : PHQ-2 Negative - No further assessment needed [HIV Test offered] : HIV Test offered [Hepatitis C test offered] : Hepatitis C test offered [None] : None [With Family] : lives with family [# of Members in Household ___] :  household currently consist of [unfilled] member(s) [Employed] : employed [Graduate School] : graduate school [] :  [# Of Children ___] : has [unfilled] children [Feels Safe at Home] : Feels safe at home [Reports normal functional visual acuity (ie: able to read med bottle)] : Reports normal functional visual acuity [Smoke Detector] : smoke detector [Carbon Monoxide Detector] : carbon monoxide detector [Safety elements used in home] : safety elements used in home [Seat Belt] :  uses seat belt [Sunscreen] : uses sunscreen [With Patient/Caregiver] : , with patient/caregiver [Never] : Never [Audit-CScore] : 3 [de-identified] : WALKING DAILY, PLATFORM TENNIS, GOLF, WALKING [de-identified] : "FINE", COULD IMPROVE PORTIONS [[id number]] : 0 [Change in mental status noted] : No change in mental status noted [Language] : denies difficulty with language [Learning/Retaining New Information] : denies difficulty learning/retaining new information [Handling Complex Tasks] : denies difficulty handling complex tasks [Reports changes in hearing] : Reports no changes in hearing [Reports changes in vision] : Reports no changes in vision [Reports changes in dental health] : Reports no changes in dental health [ColonoscopyDate] : 08/20 [de-identified] : GLASSES FOR DISTANCE AND READING [AdvancecareDate] : 12/23

## 2024-02-19 ENCOUNTER — RX RENEWAL (OUTPATIENT)
Age: 64
End: 2024-02-19

## 2024-02-25 ENCOUNTER — RX RENEWAL (OUTPATIENT)
Age: 64
End: 2024-02-25

## 2024-03-19 ENCOUNTER — RX RENEWAL (OUTPATIENT)
Age: 64
End: 2024-03-19

## 2024-03-27 ENCOUNTER — APPOINTMENT (OUTPATIENT)
Dept: CARDIOLOGY | Facility: CLINIC | Age: 64
End: 2024-03-27
Payer: COMMERCIAL

## 2024-03-27 VITALS
DIASTOLIC BLOOD PRESSURE: 74 MMHG | HEIGHT: 70 IN | BODY MASS INDEX: 26.48 KG/M2 | WEIGHT: 185 LBS | HEART RATE: 76 BPM | SYSTOLIC BLOOD PRESSURE: 140 MMHG | OXYGEN SATURATION: 99 %

## 2024-03-27 DIAGNOSIS — I10 ESSENTIAL (PRIMARY) HYPERTENSION: ICD-10-CM

## 2024-03-27 DIAGNOSIS — R01.1 CARDIAC MURMUR, UNSPECIFIED: ICD-10-CM

## 2024-03-27 DIAGNOSIS — I34.0 NONRHEUMATIC MITRAL (VALVE) INSUFFICIENCY: ICD-10-CM

## 2024-03-27 PROCEDURE — 99214 OFFICE O/P EST MOD 30 MIN: CPT | Mod: 25

## 2024-03-27 PROCEDURE — 93000 ELECTROCARDIOGRAM COMPLETE: CPT

## 2024-03-31 LAB — HEMOCCULT STL QL IA: NEGATIVE

## 2024-04-11 ENCOUNTER — APPOINTMENT (OUTPATIENT)
Dept: CARDIOLOGY | Facility: CLINIC | Age: 64
End: 2024-04-11
Payer: COMMERCIAL

## 2024-04-11 PROCEDURE — 93306 TTE W/DOPPLER COMPLETE: CPT

## 2024-04-11 NOTE — DISCUSSION/SUMMARY
[EKG obtained to assist in diagnosis and management of assessed problem(s)] : EKG obtained to assist in diagnosis and management of assessed problem(s) [FreeTextEntry1] : Hypertension Well-controlled. Continue medical therapy  Hypercholesterolemia Cholesterol at target 12/2023  Mitral regurgitation An echocardiogram was ordered to rule out a cardiomyopathy or valvular abnormality as the etiology of his murmur  TTM

## 2024-04-11 NOTE — CARDIOLOGY SUMMARY
[de-identified] : Normal sinus rhythm [de-identified] : - hypertension - hypercholesterolemia - abnormal ECG - EXERCISE STRESS TEST: 1/11/2022, normal, poor exercise tolerance, D5 - ECHOCARDIOGRAM: 4/11/2024 EF~50%, trace mitral regurgitation and tricuspid regurgitation no PA systolic  (1/24/2022, EF 55%, moderate mitral regurgitation, trace tricuspid regurgitation RVSP 19)

## 2024-04-11 NOTE — HISTORY OF PRESENT ILLNESS
[FreeTextEntry1] : The patient is a 64-year-old white male with a past medical history remarkable for hypertension &  hypercholesterolemia. The patient has completed platform tennis season.  He walks regularly without chest pain or dyspnea.  His cholesterol from 12/2023 is at target

## 2024-04-11 NOTE — PHYSICAL EXAM

## 2024-04-18 ENCOUNTER — APPOINTMENT (OUTPATIENT)
Dept: CARDIOLOGY | Facility: CLINIC | Age: 64
End: 2024-04-18

## 2024-05-19 ENCOUNTER — RX RENEWAL (OUTPATIENT)
Age: 64
End: 2024-05-19

## 2024-05-19 RX ORDER — ATORVASTATIN CALCIUM 20 MG/1
20 TABLET, FILM COATED ORAL
Qty: 90 | Refills: 0 | Status: ACTIVE | COMMUNITY
Start: 2023-03-16 | End: 1900-01-01

## 2024-05-27 ENCOUNTER — RX RENEWAL (OUTPATIENT)
Age: 64
End: 2024-05-27

## 2024-05-27 RX ORDER — AMLODIPINE BESYLATE 5 MG/1
5 TABLET ORAL
Qty: 90 | Refills: 0 | Status: ACTIVE | COMMUNITY
Start: 2017-10-19 | End: 1900-01-01

## 2024-06-17 ENCOUNTER — RX RENEWAL (OUTPATIENT)
Age: 64
End: 2024-06-17

## 2024-06-20 ENCOUNTER — APPOINTMENT (OUTPATIENT)
Dept: FAMILY MEDICINE | Facility: CLINIC | Age: 64
End: 2024-06-20

## 2024-06-20 VITALS
OXYGEN SATURATION: 99 % | SYSTOLIC BLOOD PRESSURE: 138 MMHG | DIASTOLIC BLOOD PRESSURE: 70 MMHG | WEIGHT: 188 LBS | BODY MASS INDEX: 26.92 KG/M2 | HEART RATE: 75 BPM | HEIGHT: 70 IN

## 2024-06-20 VITALS — DIASTOLIC BLOOD PRESSURE: 84 MMHG | SYSTOLIC BLOOD PRESSURE: 134 MMHG

## 2024-06-20 PROCEDURE — 99213 OFFICE O/P EST LOW 20 MIN: CPT

## 2024-06-20 PROCEDURE — 36415 COLL VENOUS BLD VENIPUNCTURE: CPT

## 2024-06-20 RX ORDER — ATORVASTATIN CALCIUM 20 MG/1
20 TABLET, FILM COATED ORAL
Qty: 30 | Refills: 0 | Status: DISCONTINUED | COMMUNITY
Start: 2020-11-23 | End: 2024-06-20

## 2024-06-20 NOTE — HISTORY OF PRESENT ILLNESS
[de-identified] : MR. ZARATE IS A PLEASANT 62 YO PRESENTING FOR FOLLOW-UP. ROUTINELY FOLLOWS WITH CARDIOLOGY. HISTORY OF HYPERLIPIDEMIA, HYPERTENSION AND OVERWEIGHT.  REMAINS ON LIPITOR FOR CHOLESTEROL AND AMLODIPINE AND VALSARTAN FOR BLOOD PRESSURE.  HAD ECHOCARDIOGRAM IN APRIL 2024.  TRYING TO WATCH DIET.  WALKING AND GOLF FOR EXERCISE.

## 2024-06-21 LAB
ALBUMIN SERPL ELPH-MCNC: 4.9 G/DL
ALP BLD-CCNC: 70 U/L
ALT SERPL-CCNC: 49 U/L
ANION GAP SERPL CALC-SCNC: 12 MMOL/L
AST SERPL-CCNC: 34 U/L
BILIRUB SERPL-MCNC: 0.6 MG/DL
BUN SERPL-MCNC: 14 MG/DL
CALCIUM SERPL-MCNC: 9.6 MG/DL
CHLORIDE SERPL-SCNC: 105 MMOL/L
CHOLEST SERPL-MCNC: 190 MG/DL
CO2 SERPL-SCNC: 23 MMOL/L
CREAT SERPL-MCNC: 0.91 MG/DL
EGFR: 95 ML/MIN/1.73M2
GLUCOSE SERPL-MCNC: 97 MG/DL
HDLC SERPL-MCNC: 79 MG/DL
LDLC SERPL CALC-MCNC: 100 MG/DL
NONHDLC SERPL-MCNC: 111 MG/DL
POTASSIUM SERPL-SCNC: 4.2 MMOL/L
PROT SERPL-MCNC: 7 G/DL
SODIUM SERPL-SCNC: 140 MMOL/L
TRIGL SERPL-MCNC: 59 MG/DL

## 2024-06-24 ENCOUNTER — TRANSCRIPTION ENCOUNTER (OUTPATIENT)
Age: 64
End: 2024-06-24

## 2024-06-24 RX ORDER — VALSARTAN 160 MG/1
160 TABLET, COATED ORAL
Qty: 90 | Refills: 0 | Status: ACTIVE | COMMUNITY
Start: 2017-09-22 | End: 1900-01-01

## 2024-09-16 ENCOUNTER — RX RENEWAL (OUTPATIENT)
Age: 64
End: 2024-09-16

## 2024-09-16 ENCOUNTER — TRANSCRIPTION ENCOUNTER (OUTPATIENT)
Age: 64
End: 2024-09-16

## 2024-11-19 ENCOUNTER — RX RENEWAL (OUTPATIENT)
Age: 64
End: 2024-11-19

## 2024-11-20 ENCOUNTER — RX RENEWAL (OUTPATIENT)
Age: 64
End: 2024-11-20

## 2024-12-16 ENCOUNTER — TRANSCRIPTION ENCOUNTER (OUTPATIENT)
Age: 64
End: 2024-12-16

## 2024-12-16 ENCOUNTER — RX RENEWAL (OUTPATIENT)
Age: 64
End: 2024-12-16

## 2024-12-23 ENCOUNTER — APPOINTMENT (OUTPATIENT)
Dept: FAMILY MEDICINE | Facility: CLINIC | Age: 64
End: 2024-12-23

## 2025-01-10 ENCOUNTER — NON-APPOINTMENT (OUTPATIENT)
Age: 65
End: 2025-01-10

## 2025-01-10 ENCOUNTER — APPOINTMENT (OUTPATIENT)
Dept: FAMILY MEDICINE | Facility: CLINIC | Age: 65
End: 2025-01-10

## 2025-01-10 VITALS — DIASTOLIC BLOOD PRESSURE: 92 MMHG | SYSTOLIC BLOOD PRESSURE: 144 MMHG

## 2025-01-10 VITALS
BODY MASS INDEX: 27.12 KG/M2 | WEIGHT: 189 LBS | SYSTOLIC BLOOD PRESSURE: 152 MMHG | HEART RATE: 83 BPM | DIASTOLIC BLOOD PRESSURE: 92 MMHG

## 2025-01-10 DIAGNOSIS — Z82.49 FAMILY HISTORY OF ISCHEMIC HEART DISEASE AND OTHER DISEASES OF THE CIRCULATORY SYSTEM: ICD-10-CM

## 2025-01-10 PROCEDURE — 99396 PREV VISIT EST AGE 40-64: CPT

## 2025-01-10 PROCEDURE — 36415 COLL VENOUS BLD VENIPUNCTURE: CPT

## 2025-01-11 LAB
ALBUMIN SERPL ELPH-MCNC: 5 G/DL
ALP BLD-CCNC: 78 U/L
ALT SERPL-CCNC: 37 U/L
ANION GAP SERPL CALC-SCNC: 16 MMOL/L
APPEARANCE: CLEAR
AST SERPL-CCNC: 25 U/L
BASOPHILS # BLD AUTO: 0.02 K/UL
BASOPHILS NFR BLD AUTO: 0.4 %
BILIRUB SERPL-MCNC: 0.7 MG/DL
BILIRUBIN URINE: NEGATIVE
BLOOD URINE: NEGATIVE
BUN SERPL-MCNC: 14 MG/DL
CALCIUM SERPL-MCNC: 10 MG/DL
CHLORIDE SERPL-SCNC: 100 MMOL/L
CHOLEST SERPL-MCNC: 171 MG/DL
CO2 SERPL-SCNC: 23 MMOL/L
COLOR: YELLOW
CREAT SERPL-MCNC: 0.98 MG/DL
EGFR: 87 ML/MIN/1.73M2
EOSINOPHIL # BLD AUTO: 0.02 K/UL
EOSINOPHIL NFR BLD AUTO: 0.4 %
ESTIMATED AVERAGE GLUCOSE: 120 MG/DL
GLUCOSE QUALITATIVE U: NEGATIVE MG/DL
GLUCOSE SERPL-MCNC: 90 MG/DL
HBA1C MFR BLD HPLC: 5.8 %
HCT VFR BLD CALC: 46.5 %
HDLC SERPL-MCNC: 71 MG/DL
HGB BLD-MCNC: 15.5 G/DL
IMM GRANULOCYTES NFR BLD AUTO: 0.2 %
KETONES URINE: NEGATIVE MG/DL
LDLC SERPL CALC-MCNC: 88 MG/DL
LEUKOCYTE ESTERASE URINE: NEGATIVE
LYMPHOCYTES # BLD AUTO: 2.06 K/UL
LYMPHOCYTES NFR BLD AUTO: 36.8 %
MAN DIFF?: NORMAL
MCHC RBC-ENTMCNC: 30.5 PG
MCHC RBC-ENTMCNC: 33.3 G/DL
MCV RBC AUTO: 91.4 FL
MONOCYTES # BLD AUTO: 0.44 K/UL
MONOCYTES NFR BLD AUTO: 7.9 %
NEUTROPHILS # BLD AUTO: 3.05 K/UL
NEUTROPHILS NFR BLD AUTO: 54.3 %
NITRITE URINE: NEGATIVE
NONHDLC SERPL-MCNC: 100 MG/DL
PH URINE: 6
PLATELET # BLD AUTO: 223 K/UL
POTASSIUM SERPL-SCNC: 4.2 MMOL/L
PROT SERPL-MCNC: 7.3 G/DL
PROTEIN URINE: NEGATIVE MG/DL
PSA SERPL-MCNC: 3.63 NG/ML
RBC # BLD: 5.09 M/UL
RBC # FLD: 12.6 %
SODIUM SERPL-SCNC: 139 MMOL/L
SPECIFIC GRAVITY URINE: 1.01
T4 FREE SERPL-MCNC: 1.3 NG/DL
TRIGL SERPL-MCNC: 63 MG/DL
TSH SERPL-ACNC: 2.98 UIU/ML
UROBILINOGEN URINE: 0.2 MG/DL
WBC # FLD AUTO: 5.6 K/UL

## 2025-02-15 ENCOUNTER — RX RENEWAL (OUTPATIENT)
Age: 65
End: 2025-02-15

## 2025-03-13 ENCOUNTER — TRANSCRIPTION ENCOUNTER (OUTPATIENT)
Age: 65
End: 2025-03-13

## 2025-03-31 ENCOUNTER — APPOINTMENT (OUTPATIENT)
Dept: FAMILY MEDICINE | Facility: CLINIC | Age: 65
End: 2025-03-31

## 2025-04-02 ENCOUNTER — APPOINTMENT (OUTPATIENT)
Dept: CARDIOLOGY | Facility: CLINIC | Age: 65
End: 2025-04-02
Payer: COMMERCIAL

## 2025-04-02 VITALS
HEIGHT: 70 IN | SYSTOLIC BLOOD PRESSURE: 142 MMHG | BODY MASS INDEX: 27.49 KG/M2 | WEIGHT: 192 LBS | OXYGEN SATURATION: 99 % | HEART RATE: 74 BPM | DIASTOLIC BLOOD PRESSURE: 84 MMHG

## 2025-04-02 DIAGNOSIS — E78.5 HYPERLIPIDEMIA, UNSPECIFIED: ICD-10-CM

## 2025-04-02 DIAGNOSIS — I10 ESSENTIAL (PRIMARY) HYPERTENSION: ICD-10-CM

## 2025-04-02 PROCEDURE — 99213 OFFICE O/P EST LOW 20 MIN: CPT | Mod: 25

## 2025-04-02 PROCEDURE — 93000 ELECTROCARDIOGRAM COMPLETE: CPT

## 2025-05-01 ENCOUNTER — APPOINTMENT (OUTPATIENT)
Dept: CARDIOLOGY | Facility: CLINIC | Age: 65
End: 2025-05-01
Payer: COMMERCIAL

## 2025-05-01 VITALS
BODY MASS INDEX: 27.2 KG/M2 | WEIGHT: 190 LBS | HEART RATE: 88 BPM | SYSTOLIC BLOOD PRESSURE: 148 MMHG | OXYGEN SATURATION: 98 % | HEIGHT: 70 IN | DIASTOLIC BLOOD PRESSURE: 82 MMHG

## 2025-05-01 VITALS — DIASTOLIC BLOOD PRESSURE: 70 MMHG | SYSTOLIC BLOOD PRESSURE: 138 MMHG

## 2025-05-01 DIAGNOSIS — E78.5 HYPERLIPIDEMIA, UNSPECIFIED: ICD-10-CM

## 2025-05-01 DIAGNOSIS — I10 ESSENTIAL (PRIMARY) HYPERTENSION: ICD-10-CM

## 2025-05-01 PROCEDURE — 99213 OFFICE O/P EST LOW 20 MIN: CPT

## 2025-05-19 ENCOUNTER — RX RENEWAL (OUTPATIENT)
Age: 65
End: 2025-05-19

## 2025-06-12 ENCOUNTER — RX RENEWAL (OUTPATIENT)
Age: 65
End: 2025-06-12

## 2025-08-21 ENCOUNTER — TRANSCRIPTION ENCOUNTER (OUTPATIENT)
Age: 65
End: 2025-08-21